# Patient Record
Sex: FEMALE | Race: BLACK OR AFRICAN AMERICAN | NOT HISPANIC OR LATINO | Employment: FULL TIME | URBAN - METROPOLITAN AREA
[De-identification: names, ages, dates, MRNs, and addresses within clinical notes are randomized per-mention and may not be internally consistent; named-entity substitution may affect disease eponyms.]

---

## 2017-01-03 ENCOUNTER — ALLSCRIPTS OFFICE VISIT (OUTPATIENT)
Dept: OTHER | Facility: OTHER | Age: 37
End: 2017-01-03

## 2017-01-05 ENCOUNTER — GENERIC CONVERSION - ENCOUNTER (OUTPATIENT)
Dept: OTHER | Facility: OTHER | Age: 37
End: 2017-01-05

## 2017-02-03 ENCOUNTER — APPOINTMENT (OUTPATIENT)
Dept: URGENT CARE | Age: 37
End: 2017-02-03
Payer: OTHER MISCELLANEOUS

## 2017-02-03 PROCEDURE — 99214 OFFICE O/P EST MOD 30 MIN: CPT | Performed by: PREVENTIVE MEDICINE

## 2017-02-15 ENCOUNTER — APPOINTMENT (OUTPATIENT)
Dept: URGENT CARE | Age: 37
End: 2017-02-15
Payer: OTHER MISCELLANEOUS

## 2017-02-15 PROCEDURE — 99213 OFFICE O/P EST LOW 20 MIN: CPT | Performed by: PREVENTIVE MEDICINE

## 2018-01-10 NOTE — RESULT NOTES
Verified Results  (1) COMPREHENSIVE METABOLIC PANEL [id number] 90:70PX Jamar Fernandez Marker Order Number: [id number]     Test Name Result Flag Reference   GLUCOSE,RANDM 79 mg/dL     If the patient is fasting, the ADA then defines impaired fasting glucose as > 100 mg/dL and diabetes as > or equal to 123 mg/dL  SODIUM 135 mmol/L L 136-145   POTASSIUM 4 4 mmol/L  3 5-5 3   CHLORIDE 107 mmol/L  100-108   CARBON DIOXIDE 22 mmol/L  21-32   ANION GAP (CALC) 6 mmol/L  4-13   BLOOD UREA NITROGEN 10 mg/dL  5-25   CREATININE 0 69 mg/dL  0 60-1 30   Standardized to IDMS reference method   CALCIUM 8 6 mg/dL  8 3-10 1   BILI, TOTAL 0 39 mg/dL  0 20-1 00   ALK PHOSPHATAS 39 U/L L    ALT (SGPT) 18 U/L  12-78   AST(SGOT) 11 U/L  5-45   ALBUMIN 3 4 g/dL L 3 5-5 0   TOTAL PROTEIN 6 7 g/dL  6 4-8 2   eGFR Non-African American      >60 0 ml/min/1 [facility] Disease Education Program recommendations are as follows:  GFR calculation is accurate only with a steady state creatinine  Chronic Kidney disease less than 60 ml/min/1 73 sq  meters  Kidney failure less than 15 ml/min/1 73 sq  meters  (1) LIPID PANEL, FASTING 14Kta2629 10:50AM Jamar Fernandez Marker Order Number: [id number]     Test Name Result Flag Reference   CHOLESTEROL 121 mg/dL     HDL,DIRECT 43 mg/dL  40-60   Specimen collection should occur prior to Metamizole administration due to the potential for falsely depressed results  LDL CHOLESTEROL CALCULATED 64 mg/dL  0-100   Triglyceride:         Normal              <150 mg/dl       Borderline High    150-199 mg/dl       High               200-499 mg/dl       Very High          >499 mg/dl  Cholesterol:         Desirable        <200 mg/dl      Borderline High  200-239 mg/dl      High             >239 mg/dl  HDL Cholesterol:        High    >59 mg/dL      Low     <41 mg/dL  LDL CALCULATED:    This screening LDL is a calculated result    It does not have the accuracy of the Direct Measured LDL in the monitoring of patients with hyperlipidemia and/or statin therapy  Direct Measure LDL (GKK471) must be ordered separately in these patients  TRIGLYCERIDES 69 mg/dL  <=150   Specimen collection should occur prior to N-Acetylcysteine or Metamizole administration due to the potential for falsely depressed results  (1) TSH WITH FT4 REFLEX 93Zxf9231 10:50AM Otoniel Fernandez Order Number: [id number]     Test Name Result Flag Reference   TSH 0 564 uIU/mL  0 358-3 740   Patients undergoing fluorescein dye angiography may retain small amounts of fluorescein in the body for 48-72 hours post procedure  Samples containing fluorescein can produce falsely depressed TSH values  If the patient had this procedure,a specimen should be resubmitted post fluorescein clearance            The recommended reference ranges for TSH during pregnancy are as follows:  First trimester 0 1 to 2 5 uIU/mL  Second trimester  0 2 to 3 0 uIU/mL  Third trimester 0 3 to 3 0 uIU/m       Discussion/Summary     cholesterol looks good   normal liver, kidney function and blood glucose   overall looks good!   - Dr Chet Wright   Electronically signed by : Rolo Hoffman MD; Aug 22 2016  7:48AM EST                       (Author)

## 2018-01-11 NOTE — PROGRESS NOTES
Assessment    1  Encounter for preventive health examination (V70 0) (Z00 00)   2  Lipid screening (V77 91) (Z13 220)   3  Encounter for screening for cardiovascular disorders (V81 2) (Z13 6)    Plan  Encounter for screening for cardiovascular disorders, Lipid screening    · (1) COMPREHENSIVE METABOLIC PANEL; Status:Active; Requested for:11May2016;    · (1) LIPID PANEL, FASTING; Status:Active; Requested for:11May2016;    · (1) TSH WITH FT4 REFLEX; Status:Active; Requested for:11May2016; Health Maintenance    · Follow-up visit in 1 year Evaluation and Treatment  Follow-up  Status: Hold For -  Scheduling  Requested for: 86VUB2038   · Begin a limited exercise program ; Status:Complete;   Done: 17YKK3820 09:37AM   · Begin or continue regular aerobic exercise  Gradually work up to at least 3 sessions of 30  minutes of exercise a week ; Status:Complete;   Done: 84ZTD4482 09:37AM   · Brush your teeth 3 times a day and floss at least once a day ; Status:Complete;   Done:  60QEF1491 09:37AM   · Eat a low fat and low cholesterol diet ; Status:Complete;   Done: 79TMB7853 09:37AM   · Use a sun block product with an SPF of 15 or more ; Status:Complete;   Done:  89GUS6934 09:37AM   · Vitamins can help you get daily requirements that your diet may not be giving you ;  Status:Complete;   Done: 07WTB7489 09:37AM   · We recommend routine visits to a dentist ; Status:Complete;   Done: 50GUI3420 09:37AM   · Call [phone] if: You find a new or different kind of lump in your breast ;  Status:Complete;   Done: 95DHH5834 09:37AM   · Call [phone] if: You have any warning signs of skin cancer ; Status:Complete;    Done: 96LHZ2570 09:37AM    Discussion/Summary  health maintenance visit Currently, she eats a healthy diet   the risks and benefits of cervical cancer screening were discussed WILL BRING BACK PATIENT FOR PAP SMEAR Breast cancer screening: the risks and benefits of breast cancer screening were discussed and breast cancer screening is not indicated  Colorectal cancer screening: the risks and benefits of colorectal cancer screening were discussed and colorectal cancer screening is not indicated  Osteoporosis screening: the risks and benefits of osteoporosis screening were discussed and bone mineral density testing is not indicated  Screening lab work includes glucose, lipid profile and thyroid function testing  The immunizations are up to date  She was advised to be evaluated by an ophthalmologist and a dentist  Advice and education were given regarding nutrition, aerobic exercise, calcium supplements, vitamin D supplements, reproductive health, contraception, alcohol use, sunscreen use, self skin examination, helmet use and seat belt use  Patient discussion: discussed with the patient  Chief Complaint  Np here for wellness exam      History of Present Illness  HM, Adult Female: The patient is being seen for a health maintenance evaluation  The last health maintenance visit was 1 year(s) ago  General Health: The patient's health since the last visit is described as good  She does not have regular dental visits  The patient hasn't had a dental visit  She denies vision problems  She denies hearing loss  Immunizations status: up to date  Lifestyle:  She consumes a diverse and healthy diet  She does not have any weight concerns  She does not exercise regularly  She does not use tobacco  She denies alcohol use  She denies drug use  Reproductive health:  she reports normal menses  Menstrual history: LMP: the last menstrual period was last month   Recent menstrual periods: bleeding has been normal  The cycles have been regular  The duration of her recent periods has been regular  she uses no contraception  she is not sexually active  pregnancy history: G 2P 2, 2  Screening: cancer screening reviewed and updated  metabolic screening reviewed and updated  risk screening reviewed and updated     HPI: [address] ABOUT 1 YEAR AGO  TDAP GIVEN BACK IN HER COUNTRY 2 YEARS AGO       Review of Systems    Constitutional: No fever, no chills, feels well, no tiredness, no recent weight gain or weight loss  Eyes: No complaints of eye pain, no red eyes, no eyesight problems, no discharge, no dry eyes, no itching of eyes  ENT: no complaints of earache, no loss of hearing, no nose bleeds, no nasal discharge, no sore throat, no hoarseness  Cardiovascular: No complaints of slow heart rate, no fast heart rate, no chest pain, no palpitations, no leg claudication, no lower extremity edema  Respiratory: No complaints of shortness of breath, no wheezing, no cough, no SOB on exertion, no orthopnea, no PND  Gastrointestinal: No complaints of abdominal pain, no constipation, no nausea or vomiting, no diarrhea, no bloody stools  Genitourinary: No complaints of dysuria, no incontinence, no pelvic pain, no dysmenorrhea, no vaginal discharge or bleeding  Musculoskeletal: No complaints of arthralgias, no myalgias, no joint swelling or stiffness, no limb pain or swelling  Integumentary: No complaints of skin rash or lesions, no itching, no skin wounds, no breast pain or lump  Neurological: No complaints of headache, no confusion, no convulsions, no numbness, no dizziness or fainting, no tingling, no limb weakness, no difficulty walking  Psychiatric: Not suicidal, no sleep disturbance, no anxiety or depression, no change in personality, no emotional problems  Endocrine: No complaints of proptosis, no hot flashes, no muscle weakness, no deepening of the voice, no feelings of weakness  Hematologic/Lymphatic: No complaints of swollen glands, no swollen glands in the neck, does not bleed easily, does not bruise easily        Past Medical History    · History of No significant past medical history    Surgical History    · History of  Section    Family History  Mother    · Family history of No known health problems  Father    · Family history of No known health problems    Social History    · Denied: History of Alcohol use   · Denied: History of Drug use   · Never a smoker   · No alcohol use   · No drug use    Current Meds   1  No Reported Medications Recorded    Allergies    1  No Known Drug Allergies    Vitals   Recorded: 22KYK3359 09:14AM   Heart Rate 63   Systolic 766   Diastolic 62   Height 5 ft 3 74 in   Weight 133 lb    BMI Calculated 23 02   BSA Calculated 1 64     Physical Exam    Constitutional   General appearance: No acute distress, well appearing and well nourished  Head and Face   Head and face: Normal     Palpation of the face and sinuses: No sinus tenderness  Eyes   Conjunctiva and lids: No swelling, erythema or discharge  Pupils and irises: Equal, round, reactive to light  Ophthalmoscopic examination: Normal fundi and optic discs  Ears, Nose, Mouth, and Throat   External inspection of ears and nose: Normal     Otoscopic examination: Tympanic membranes translucent with normal light reflex  Canals patent without erythema  Hearing: Normal     Nasal mucosa, septum, and turbinates: Normal without edema or erythema  Lips, teeth, and gums: Normal, good dentition  Oropharynx: Normal with no erythema, edema, exudate or lesions  Neck   Neck: Supple, symmetric, trachea midline, no masses  Thyroid: Normal, no thyromegaly  Pulmonary   Respiratory effort: No increased work of breathing or signs of respiratory distress  Percussion of chest: Normal     Palpation of chest: Normal     Auscultation of lungs: Clear to auscultation  Cardiovascular   Palpation of heart: Normal PMI, no thrills  Auscultation of heart: Normal rate and rhythm, normal S1 and S2, no murmurs  Examination of extremities for edema and/or varicosities: Normal     Chest   Chest: Normal     Abdomen   Abdomen: Non-tender, no masses  Liver and spleen: No hepatomegaly or splenomegaly      Examination for hernias: No hernia appreciated  Lymphatic   Palpation of lymph nodes in neck: No lymphadenopathy  Palpation of lymph nodes in axillae: No lymphadenopathy  Palpation of lymph nodes in groin: No lymphadenopathy  Palpation of lymph nodes in other areas: No lymphadenopathy  Musculoskeletal   Gait and station: Normal     Digits and nails: Normal without clubbing or cyanosis  Joints, bones, and muscles: Normal     Range of motion: Normal     Stability: Normal     Muscle strength/tone: Normal     Skin   Skin and subcutaneous tissue: Normal without rashes or lesions  Palpation of skin and subcutaneous tissue: Normal turgor  Neurologic   Cranial nerves: Cranial nerves II-XII intact  Cortical function: Normal mental status  Reflexes: 2+ and symmetric  Sensation: No sensory loss  Coordination: Normal finger to nose and heel to shin  Psychiatric   Judgment and insight: Normal     Orientation to person, place, and time: Normal     Recent and remote memory: Intact  Mood and affect: Normal        Results/Data  PHQ-2 Adult Depression Screening 80MUK5474 09:16AM User, s     Test Name Result Flag Reference   PHQ-2 Adult Depression Score 0     Over the last two weeks, how often have you been bothered by any of the following problems? Little interest or pleasure in doing things: Not at all - 0  Feeling down, depressed, or hopeless: Not at all - 0   PHQ-2 Adult Depression Screening Negative         Future Appointments    Date/Time Provider Specialty Site   05/12/2016 01:00 PM BELINDA Nelson   Orthopedic Surgery [facility]     Signatures   Electronically signed by : Noam Sims MD; May 11 2016  9:38AM EST                       (Author)

## 2018-01-11 NOTE — MISCELLANEOUS
Message   Recorded as Task   Date: 12/13/2016 04:03 PM, Created By: Maxine Abarca   Task Name: Follow Up   Assigned To: [facility]   Regarding Patient: Maria Antonia Pagan, Status: Active   Comment:    Gaye Hartman - 13 Dec 2016 4:03 PM     TASK CREATED  Caller: Gordo Garcia; General Medical Question; [phone]  Received a TC from Gordo Garcia nurse   The pt's employer is inquirying as to when the pt  will be able to return to full duty c/ no restrictions  She is requesting a procedure note from the RFA she had done this past week along with a note for the employer with work restrictions be faxed to [phone]  AS to advise  thanks   [address] - 13 Dec 2016 4:16 PM     TASK REPLIED TO: Previously Assigned To [facility],Team  Work restriction note placed in [employer]  Ros Maldonado - 14 Dec 2016 9:05 AM     TASK EDITED  Work restriction letter & procedure note were faxed to number requested  L/M on  # making aware  Kyrie Turner - 14 Dec 2016 11:30 AM     TASK REPLIED TO: Previously Assigned To [place]  Thanks  Active Problems    1  Acute maxillary sinusitis (461 0) (J01 00)   2  Cervical myofascial pain syndrome (729 1) (M79 1)   3  Cervical pain (neck) (723 1) (M54 2)   4  Cervical spondylosis (721 0) (M47 812)   5  Chronic pain syndrome (338 4) (G89 4)   6  Cough (786 2) (R05)   7  Encounter for screening for cardiovascular disorders (V81 2) (Z13 6)   8  Lipid screening (V77 91) (Z13 220)    Allergies    1   No Known Drug Allergies    Signatures   Electronically signed by : Didier Santiago RN; Dec 14 2016 11:31AM EST                       (Author)

## 2018-01-12 NOTE — MISCELLANEOUS
Message   Recorded as Task   Date: 12/15/2016 11:07 AM, Created By: Gabby Holt   Task Name: Miscellaneous   Assigned To: Gabby Holt   Regarding Patient: Zohra Gomez, Status: Active   CommentPanakul Jara - 15 Dec 9641 51:10 AM     TASK CREATED  Pt called and said that she is in an increased amount of pain since RFA  I explained it could take up to 4 weeks and she is requesting a note for work to take her out for a couple of days becuase she said standing makes it worse  Please advise  Thanks   Kyrie Turner - 15 Dec 2016 3:47 PM     TASK REPLIED TO: Previously Assigned To [address]  I am going to give her a course of steroids for one weeks duration to help ease the pain from the RFA  Please have her take it and continue work  Medrol dosepak sent to pharmacy  Olimpia Orellana - 16 Dec 3214 9:20 AM     TASK EDITED  Patient aware  Active Problems    1  Acute maxillary sinusitis (461 0) (J01 00)   2  Cervical myofascial pain syndrome (729 1) (M79 1)   3  Cervical pain (neck) (723 1) (M54 2)   4  Cervical spondylosis (721 0) (M47 812)   5  Chronic pain syndrome (338 4) (G89 4)   6  Cough (786 2) (R05)   7  Encounter for screening for cardiovascular disorders (V81 2) (Z13 6)   8  Lipid screening (V77 91) (Z13 220)    Current Meds   1  MethylPREDNISolone 4 MG Oral Tablet Therapy Pack; take as directed; Therapy: 74RRO9012 to (Last Rx:38Kzg1111)  Requested for: 37Ybt2351 Ordered    Allergies    1   No Known Drug Allergies    Signatures   Electronically signed by : Gaviota ePrson, ; Dec 16 2016  8:16AM EST                       (Author)

## 2018-01-12 NOTE — MISCELLANEOUS
Message   Recorded as Task   Date: 12/13/2016 04:03 PM, Created By: [facility]   Task Name: Follow Up   Assigned To: [facility]   Regarding Patient: Galina Silva, Status: Active   Comment:    DevanGaye horner - 13 Dec 2016 4:03 PM     TASK CREATED  Caller: Nna Hoffmann; General Medical Question; [phone]  Received a TC from Nan Hoffmann nurse   The pt's employer is inquirying as to when the pt  will be able to return to full duty c/ no restrictions  She is requesting a procedure note from the RFA she had done this past week along with a note for the employer with work restrictions be faxed to [phone]  AS to advistamera Edmond - 13 Dec 2016 4:16 PM     TASK REPLIED TO: Previously Assigned To [facility],Team  Work restriction note placed in [employer]  Ros Maldonado - 14 Dec 2016 9:05 AM     TASK EDITED  Work restriction letter & procedure note were faxed to number requested  L/M on  # making aware  Kyrie Turner - 14 Dec 2016 11:30 AM     TASK REPLIED TO: Previously Assigned To [place]  Thanks  Stavropoulos,Ros - 14 Dec 2016 11:31 AM     TASK COMPLETED   Ros Maldonado - 14 Dec 2016 1:10 PM     TASK REACTIVATED   Ros Maldonado - 14 Dec 2016 1:25 PM     TASK EDITED  [employer] called, she is requesting clarification on the return to work note  Currently the pt is worling but restricted to driving clients  She is requesting that a line is added to the note stating continue current restrictions until 1/6/16  After her f/u w/Dr Turner 1/3/17 pt will return to [facility] and they then will decided her final restrictions  Luna Edmond - 14 Dec 2016 2:38 PM     TASK REPLIED TO: Previously Assigned To [place] Ed[place]  Work note updated in [employer]scripts  Ros Maldonado - 14 Dec 2016 2:43 PM     TASK EDITED  Faxed as requested  Active Problems    1   Acute maxillary sinusitis (461 0) (J01 00)   2  Cervical myofascial pain syndrome (729 1) (M79 1)   3  Cervical pain (neck) (723 1) (M54 2)   4  Cervical spondylosis (721 0) (M47 812)   5  Chronic pain syndrome (338 4) (G89 4)   6  Cough (786 2) (R05)   7  Encounter for screening for cardiovascular disorders (V81 2) (Z13 6)   8  Lipid screening (V77 91) (Z13 220)    Allergies    1   No Known Drug Allergies    Signatures   Electronically signed by : Gabby Tierney RN; Dec 14 2016  2:59PM EST                       (Author)

## 2018-01-13 NOTE — MISCELLANEOUS
Message   Recorded as Task   Date: 11/15/2016 08:56 AM, Created By: Mally Ramirez   Task Name: Care Coordination   Assigned To: [address] ,Team   Regarding Patient: Octaviano Pfeiffer, Status: Active   Comment:    Mally Coffeynimo - 15 Nov 2016 8:56 AM     TASK CREATED  Received pain diary after MBB #1  It looks like the patient is scheduled for RFA  Did the insurance approve the RFA after one MBB or do we have to do a second MBB before RFA? Gaye Hartman - 15 Nov 2016 10:12 AM     TASK EDITED   Faina Letters - 16 Nov 2016 3:30 PM     TASK EDITED    Pt  mac requesting notes sent to her  **Spoke to Mario Rai from the pt's [place] stating that she needs the procedure note from 11/11/16 faxed to [phone] then she can submit for approval to do RFA without having to do MBB#2  Amie's office # [phone]   [facility] - 17 Nov 2016 10:18 AM     TASK REASSIGNED: Previously Assigned To [facility],Team   Procedure Note dated 11/11/16 was faxed over to Mario Rai ~ [employer], Fax # [phone]  Active Problems    1  Acute maxillary sinusitis (461 0) (J01 00)   2  Cervical myofascial pain syndrome (729 1) (M79 1)   3  Cervical pain (neck) (723 1) (M54 2)   4  Cervical spondylosis (721 0) (M47 812)   5  Chronic pain syndrome (338 4) (G89 4)   6  Cough (786 2) (R05)   7  Encounter for screening for cardiovascular disorders (V81 2) (Z13 6)   8  Lipid screening (V77 91) (Z13 220)    Allergies    1   No Known Drug Allergies    Signatures   Electronically signed by : Lamont Liz, ; Nov 17 2016 10:24AM EST                       (Author)

## 2018-01-13 NOTE — PROGRESS NOTES
Assessment    1  Acute maxillary sinusitis (461 0) (J01 00)   2  Cough (786 2) (R05)    Plan  Acute maxillary sinusitis, Cough    · Azithromycin 250 MG Oral Tablet; TAKE 2 TABLETS ON DAY 1 THEN TAKE 1  TABLET A DAY FOR 4 DAYS    Discussion/Summary  went over blood work with her today   Possible side effects of new medications were reviewed with the patient/guardian today  The treatment plan was reviewed with the patient/guardian  The patient/guardian understands and agrees with the treatment plan      Chief Complaint  Patient here for a follow-up to go over blood work   Patient is here today for follow up of chronic conditions described in HPI  History of Present Illness  didn't get her Augmentin filled due to $75 co-pay cost  still coughing and sinus issues but better than last week   taking OTC cough medications where are helping   The patient is being seen for an initial evaluation of sinusitis  The sinusitis involves the maxillary sinuses  The patient is currently experiencing symptoms  facial pain facial pressure headache no retro-orbital pain no dental pain nasal congestion clear rhinorrhea no purulent rhinorrhea no postnasal drainage   Associated symptoms:  cough, but no fever, no chills, no nausea, no ear fullness, no ear pain, no ear pressure, no diplopia, no periorbital redness, no periorbital swelling, no neck stiffness and no sore throat  Current treatment includes nasal washes and non-prescription analgesics  By report, there is fair symptom control  Review of Systems    Constitutional: No fever, no chills, feels well, no tiredness, no recent weight gain or weight loss  Eyes: No complaints of eye pain, no red eyes, no eyesight problems, no discharge, no dry eyes, no itching of eyes  ENT: as noted in HPI  Cardiovascular: No complaints of slow heart rate, no fast heart rate, no chest pain, no palpitations, no leg claudication, no lower extremity edema     Respiratory: cough, but no shortness of breath, no wheezing and no shortness of breath during exertion  Gastrointestinal: No complaints of abdominal pain, no constipation, no nausea or vomiting, no diarrhea, no bloody stools  Active Problems    1  Acute maxillary sinusitis (461 0) (J01 00)   2  Cervical pain (neck) (723 1) (M54 2)   3  Cough (786 2) (R05)   4  Encounter for screening for cardiovascular disorders (V81 2) (Z13 6)   5  Lipid screening (V77 91) (V56 964)    Past Medical History    1  History of No significant past medical history    The active problems and past medical history were reviewed and updated today  Surgical History    1  History of  Section    The surgical history was reviewed and updated today  Family History  Mother    1  Family history of No known health problems  Father    2  Family history of No known health problems    The family history was reviewed and updated today  Social History    · Denied: History of Alcohol use   · Denied: History of Drug use   · Never a smoker   · No alcohol use   · No drug use  The social history was reviewed and updated today  The social history was reviewed and is unchanged  Current Meds   1  Amoxicillin-Pot Clavulanate 875-125 MG Oral Tablet; TAKE 1 TABLET EVERY 12 HOURS   WITH MEALS UNTIL GONE;   Therapy: 79EHP3192 to (Complete:59Nsx8315)  Requested for: 36Pax8121; Last   Rx:73Vur2289 Ordered   2  Naproxen TABS; Therapy: (Recorded:33Bab1487) to Recorded    The medication list was reviewed and updated today  Allergies    1  No Known Drug Allergies    Vitals  Vital Signs    Recorded: 12BYT5991 67:80OE   Systolic 868   Diastolic 60   Heart Rate 68   Respiration 18   Height 5 ft 3 7 in   Weight 133 lb    BMI Calculated 23 04   BSA Calculated 1 64     Physical Exam    Constitutional   General appearance: No acute distress, well appearing and well nourished  Eyes   Conjunctiva and lids: No swelling, erythema or discharge      Pupils and irises: Equal, round and reactive to light  Ears, Nose, Mouth, and Throat   External inspection of ears and nose: Normal     Otoscopic examination: Tympanic membranes translucent with normal light reflex  Canals patent without erythema  Nasal mucosa, septum, and turbinates: Normal without edema or erythema  Oropharynx: Abnormal   post nasal drip  Pulmonary   Respiratory effort: No increased work of breathing or signs of respiratory distress  Auscultation of lungs: Clear to auscultation  Cardiovascular   Palpation of heart: Normal PMI, no thrills  Auscultation of heart: Normal rate and rhythm, normal S1 and S2, without murmurs  Lymphatic   Palpation of lymph nodes in neck: No lymphadenopathy  Results/Data  (1) COMPREHENSIVE METABOLIC PANEL [id number] 77:50BY Lennox Fernandez Order Number: [id number]     Test Name Result Flag Reference   GLUCOSE,RANDM 79 mg/dL     If the patient is fasting, the ADA then defines impaired fasting glucose as > 100 mg/dL and diabetes as > or equal to 123 mg/dL  SODIUM 135 mmol/L L 136-145   POTASSIUM 4 4 mmol/L  3 5-5 3   CHLORIDE 107 mmol/L  100-108   CARBON DIOXIDE 22 mmol/L  21-32   ANION GAP (CALC) 6 mmol/L  4-13   BLOOD UREA NITROGEN 10 mg/dL  5-25   CREATININE 0 69 mg/dL  0 60-1 30   Standardized to IDMS reference method   CALCIUM 8 6 mg/dL  8 3-10 1   BILI, TOTAL 0 39 mg/dL  0 20-1 00   ALK PHOSPHATAS 39 U/L L    ALT (SGPT) 18 U/L  12-78   AST(SGOT) 11 U/L  5-45   ALBUMIN 3 4 g/dL L 3 5-5 0   TOTAL PROTEIN 6 7 g/dL  6 4-8 2   eGFR Non-African American      >60 0 ml/min/1 [facility] Disease Education Program recommendations are as follows:  GFR calculation is accurate only with a steady state creatinine  Chronic Kidney disease less than 60 ml/min/1 73 sq  meters  Kidney failure less than 15 ml/min/1 73 sq  meters       (1) LIPID PANEL, FASTING [id number] 10:50AM Lennox Fernandez Order Number: [id number] Test Name Result Flag Reference   CHOLESTEROL 121 mg/dL     HDL,DIRECT 43 mg/dL  40-60   Specimen collection should occur prior to Metamizole administration due to the potential for falsely depressed results  LDL CHOLESTEROL CALCULATED 64 mg/dL  0-100   Triglyceride:         Normal              <150 mg/dl       Borderline High    150-199 mg/dl       High               200-499 mg/dl       Very High          >499 mg/dl  Cholesterol:         Desirable        <200 mg/dl      Borderline High  200-239 mg/dl      High             >239 mg/dl  HDL Cholesterol:        High    >59 mg/dL      Low     <41 mg/dL  LDL CALCULATED:    This screening LDL is a calculated result  It does not have the accuracy of the Direct Measured LDL in the monitoring of patients with hyperlipidemia and/or statin therapy  Direct Measure LDL (KES286) must be ordered separately in these patients  TRIGLYCERIDES 69 mg/dL  <=150   Specimen collection should occur prior to N-Acetylcysteine or Metamizole administration due to the potential for falsely depressed results  (1) TSH WITH FT4 REFLEX 43Jxf6834 10:50AM Estuardo Fernandez Order Number: [id number]     Test Name Result Flag Reference   TSH 0 564 uIU/mL  0 358-3 740   Patients undergoing fluorescein dye angiography may retain small amounts of fluorescein in the body for 48-72 hours post procedure  Samples containing fluorescein can produce falsely depressed TSH values  If the patient had this procedure,a specimen should be resubmitted post fluorescein clearance            The recommended reference ranges for TSH during pregnancy are as follows:  First trimester 0 1 to 2 5 uIU/mL  Second trimester  0 2 to 3 0 uIU/mL  Third trimester 0 3 to 3 0 uIU/m     Signatures   Electronically signed by : Kandis Kennedy MD; Aug 22 2016  2:02PM EST                       (Author)

## 2018-01-13 NOTE — MISCELLANEOUS
Message   Recorded as Task   Date: 12/12/2016 12:02 PM, Created By: Susan Martinez   Task Name: Follow Up   Assigned To: [facility],Team   Regarding Patient: Didi Hester, Status: Active   Comment:    Ros Maldonado - 12 Dec 2016 12:02 PM     TASK CREATED  Pt is post L C3 C4 C5 C6 RFA done on 12/9/16 w/Dr Turner  Pt states she still has pain but it is improving  Informed pt it can take up to 5-6 weeks for the nerves to degenerate  Pt reports that she has no problems with the injection sites  Confirmed sam w/Leah on 1/3/17  Ros Maldonado - 12 Dec 2016 12:02 PM     TASK EDITED   [address] [employer] [address] - 12 Dec 2016 12:43 PM     TASK REPLIED TO: Previously Assigned To [place]  Thanks  Active Problems    1  Acute maxillary sinusitis (461 0) (J01 00)   2  Cervical myofascial pain syndrome (729 1) (M79 1)   3  Cervical pain (neck) (723 1) (M54 2)   4  Cervical spondylosis (721 0) (M47 812)   5  Chronic pain syndrome (338 4) (G89 4)   6  Cough (786 2) (R05)   7  Encounter for screening for cardiovascular disorders (V81 2) (Z13 6)   8  Lipid screening (V77 91) (Z13 220)    Allergies    1   No Known Drug Allergies    Signatures   Electronically signed by : Alber Jiménez RN; Dec 12 2016 12:51PM EST                       (Author)

## 2018-01-14 VITALS
HEIGHT: 64 IN | BODY MASS INDEX: 23.56 KG/M2 | DIASTOLIC BLOOD PRESSURE: 62 MMHG | HEART RATE: 76 BPM | SYSTOLIC BLOOD PRESSURE: 104 MMHG | WEIGHT: 138 LBS
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
## 2018-01-15 NOTE — MISCELLANEOUS
Message  Return to work or school:        Continue current work restrictions until patient seen in follow up on 1/3/17     Kaila Nelson MD       Signatures   Electronically signed by : BELINDA Mohr ; Dec 13 2016  4:15PM EST                       (Author)    Electronically signed by : BELINDA Mohr ; Dec 14 2016  2:38PM EST                       (Author)

## 2018-01-15 NOTE — MISCELLANEOUS
Message  Spoke with Liliana Wasserman ~ Nurse  for patient, she requested copy of last office note, DOS: 01/03/2017 faxed to her attn, Fax # [phone]  Note was faxed! Active Problems    1  Acute maxillary sinusitis (461 0) (J01 00)   2  Cervical myofascial pain syndrome (729 1) (M79 1)   3  Cervical pain (neck) (723 1) (M54 2)   4  Cervical spondylosis (721 0) (M47 812)   5  Chronic pain syndrome (338 4) (G89 4)   6  Cough (786 2) (R05)   7  Encounter for screening for cardiovascular disorders (V81 2) (Z13 6)   8  Lipid screening (V77 91) (Z13 220)    Current Meds   1  MethylPREDNISolone 4 MG Oral Tablet Therapy Pack; take as directed; Therapy: 10ACJ7747 to (Last Rx:84Whe9517)  Requested for: 72Yin6329 Ordered    Allergies    1   No Known Drug Allergies    Signatures   Electronically signed by : Lamont Pantoja, ; Jan 5 2017 10:35AM EST                       (Author)

## 2018-01-26 ENCOUNTER — APPOINTMENT (OUTPATIENT)
Dept: URGENT CARE | Age: 38
End: 2018-01-26
Payer: OTHER MISCELLANEOUS

## 2018-01-26 PROCEDURE — 99213 OFFICE O/P EST LOW 20 MIN: CPT | Performed by: PREVENTIVE MEDICINE

## 2018-02-15 ENCOUNTER — CLINICAL SUPPORT (OUTPATIENT)
Dept: PAIN MEDICINE | Facility: CLINIC | Age: 38
End: 2018-02-15
Payer: OTHER MISCELLANEOUS

## 2018-02-15 VITALS
DIASTOLIC BLOOD PRESSURE: 61 MMHG | HEIGHT: 62 IN | HEART RATE: 67 BPM | WEIGHT: 138 LBS | TEMPERATURE: 97.6 F | BODY MASS INDEX: 25.4 KG/M2 | SYSTOLIC BLOOD PRESSURE: 98 MMHG

## 2018-02-15 DIAGNOSIS — M54.2 CERVICAL PAIN (NECK): ICD-10-CM

## 2018-02-15 DIAGNOSIS — M47.812 CERVICAL FACET SYNDROME: Primary | ICD-10-CM

## 2018-02-15 DIAGNOSIS — G89.4 CHRONIC PAIN DISORDER: ICD-10-CM

## 2018-02-15 DIAGNOSIS — M79.18 CERVICAL MYOFASCIAL PAIN SYNDROME: ICD-10-CM

## 2018-02-15 PROCEDURE — 99214 OFFICE O/P EST MOD 30 MIN: CPT | Performed by: ANESTHESIOLOGY

## 2018-02-15 RX ORDER — IBUPROFEN 400 MG/1
TABLET ORAL EVERY 6 HOURS PRN
COMMUNITY
End: 2018-02-22
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
## 2018-02-22 ENCOUNTER — HOSPITAL ENCOUNTER (EMERGENCY)
Facility: HOSPITAL | Age: 38
Discharge: HOME | End: 2018-02-22
Attending: EMERGENCY MEDICINE

## 2018-02-22 VITALS
DIASTOLIC BLOOD PRESSURE: 60 MMHG | SYSTOLIC BLOOD PRESSURE: 102 MMHG | HEIGHT: 62 IN | WEIGHT: 136 LBS | BODY MASS INDEX: 25.03 KG/M2 | OXYGEN SATURATION: 100 % | RESPIRATION RATE: 18 BRPM | TEMPERATURE: 97.6 F | HEART RATE: 71 BPM

## 2018-02-22 DIAGNOSIS — K52.9 ENTERITIS: Primary | ICD-10-CM

## 2018-02-22 DIAGNOSIS — R10.84 GENERALIZED ABDOMINAL PAIN: ICD-10-CM

## 2018-02-22 LAB
ALBUMIN SERPL BCP-MCNC: 3.5 G/DL (ref 3.5–5)
ALP SERPL-CCNC: 59 U/L (ref 46–116)
ALT SERPL W P-5'-P-CCNC: 21 U/L (ref 12–78)
ANION GAP SERPL CALCULATED.3IONS-SCNC: 6 MMOL/L (ref 4–13)
AST SERPL W P-5'-P-CCNC: 15 U/L (ref 5–45)
BACTERIA UR QL AUTO: NORMAL /HPF
BASOPHILS # BLD AUTO: 0.02 THOUSANDS/ΜL (ref 0–0.1)
BASOPHILS NFR BLD AUTO: 0 % (ref 0–1)
BILIRUB SERPL-MCNC: 0.26 MG/DL (ref 0.2–1)
BILIRUB UR QL STRIP: NEGATIVE
BUN SERPL-MCNC: 10 MG/DL (ref 5–25)
CALCIUM SERPL-MCNC: 7.8 MG/DL (ref 8.3–10.1)
CHLORIDE SERPL-SCNC: 111 MMOL/L (ref 100–108)
CLARITY UR: CLEAR
CO2 SERPL-SCNC: 23 MMOL/L (ref 21–32)
COLOR UR: YELLOW
CREAT SERPL-MCNC: 0.6 MG/DL (ref 0.6–1.3)
EOSINOPHIL # BLD AUTO: 0.06 THOUSAND/ΜL (ref 0–0.61)
EOSINOPHIL NFR BLD AUTO: 1 % (ref 0–6)
ERYTHROCYTE [DISTWIDTH] IN BLOOD BY AUTOMATED COUNT: 17.1 % (ref 11.6–15.1)
EXT PREG TEST URINE: NEGATIVE
GFR SERPL CREATININE-BSD FRML MDRD: 137 ML/MIN/1.73SQ M
GLUCOSE SERPL-MCNC: 83 MG/DL (ref 65–140)
GLUCOSE UR STRIP-MCNC: NEGATIVE MG/DL
HCT VFR BLD AUTO: 33.9 % (ref 34.8–46.1)
HGB BLD-MCNC: 10.4 G/DL (ref 11.5–15.4)
HGB UR QL STRIP.AUTO: ABNORMAL
HYALINE CASTS #/AREA URNS LPF: NORMAL /LPF
KETONES UR STRIP-MCNC: NEGATIVE MG/DL
LEUKOCYTE ESTERASE UR QL STRIP: NEGATIVE
LIPASE SERPL-CCNC: 116 U/L (ref 73–393)
LYMPHOCYTES # BLD AUTO: 2.3 THOUSANDS/ΜL (ref 0.6–4.47)
LYMPHOCYTES NFR BLD AUTO: 37 % (ref 14–44)
MCH RBC QN AUTO: 22.9 PG (ref 26.8–34.3)
MCHC RBC AUTO-ENTMCNC: 30.7 G/DL (ref 31.4–37.4)
MCV RBC AUTO: 75 FL (ref 82–98)
MONOCYTES # BLD AUTO: 0.48 THOUSAND/ΜL (ref 0.17–1.22)
MONOCYTES NFR BLD AUTO: 8 % (ref 4–12)
NEUTROPHILS # BLD AUTO: 3.26 THOUSANDS/ΜL (ref 1.85–7.62)
NEUTS SEG NFR BLD AUTO: 54 % (ref 43–75)
NITRITE UR QL STRIP: NEGATIVE
NON-SQ EPI CELLS URNS QL MICRO: NORMAL /HPF
NRBC BLD AUTO-RTO: 0 /100 WBCS
PH UR STRIP.AUTO: 5.5 [PH] (ref 4.5–8)
PLATELET # BLD AUTO: 368 THOUSANDS/UL (ref 149–390)
PMV BLD AUTO: 10.8 FL (ref 8.9–12.7)
POTASSIUM SERPL-SCNC: 4.1 MMOL/L (ref 3.5–5.3)
PROT SERPL-MCNC: 7.3 G/DL (ref 6.4–8.2)
PROT UR STRIP-MCNC: NEGATIVE MG/DL
RBC # BLD AUTO: 4.55 MILLION/UL (ref 3.81–5.12)
RBC #/AREA URNS AUTO: NORMAL /HPF
SODIUM SERPL-SCNC: 140 MMOL/L (ref 136–145)
SP GR UR STRIP.AUTO: 1.01 (ref 1–1.03)
UROBILINOGEN UR QL STRIP.AUTO: 0.2 E.U./DL
WBC # BLD AUTO: 6.15 THOUSAND/UL (ref 4.31–10.16)
WBC #/AREA URNS AUTO: NORMAL /HPF

## 2018-02-22 PROCEDURE — 81002 URINALYSIS NONAUTO W/O SCOPE: CPT

## 2018-02-22 PROCEDURE — 83690 ASSAY OF LIPASE: CPT | Performed by: EMERGENCY MEDICINE

## 2018-02-22 PROCEDURE — 81025 URINE PREGNANCY TEST: CPT

## 2018-02-22 PROCEDURE — 96374 THER/PROPH/DIAG INJ IV PUSH: CPT

## 2018-02-22 PROCEDURE — 80053 COMPREHEN METABOLIC PANEL: CPT | Performed by: EMERGENCY MEDICINE

## 2018-02-22 PROCEDURE — 81001 URINALYSIS AUTO W/SCOPE: CPT

## 2018-02-22 PROCEDURE — 99284 EMERGENCY DEPT VISIT MOD MDM: CPT

## 2018-02-22 PROCEDURE — 96375 TX/PRO/DX INJ NEW DRUG ADDON: CPT

## 2018-02-22 PROCEDURE — 36415 COLL VENOUS BLD VENIPUNCTURE: CPT | Performed by: EMERGENCY MEDICINE

## 2018-02-22 PROCEDURE — 85025 COMPLETE CBC W/AUTO DIFF WBC: CPT | Performed by: EMERGENCY MEDICINE

## 2018-02-22 PROCEDURE — 96361 HYDRATE IV INFUSION ADD-ON: CPT

## 2018-02-22 RX ORDER — IBUPROFEN 600 MG/1
600 TABLET ORAL EVERY 6 HOURS PRN
Qty: 30 TABLET | Refills: 0 | Status: SHIPPED | OUTPATIENT
Start: 2018-02-22 | End: 2018-05-21

## 2018-02-22 RX ORDER — DICYCLOMINE HCL 20 MG
20 TABLET ORAL ONCE
Qty: 1 TABLET | Refills: 0 | Status: SHIPPED | OUTPATIENT
Start: 2018-02-22 | End: 2018-04-04 | Stop reason: ALTCHOICE

## 2018-02-22 RX ORDER — DICYCLOMINE HCL 20 MG
20 TABLET ORAL ONCE
Status: COMPLETED | OUTPATIENT
Start: 2018-02-22 | End: 2018-02-22

## 2018-02-22 RX ORDER — KETOROLAC TROMETHAMINE 30 MG/ML
15 INJECTION, SOLUTION INTRAMUSCULAR; INTRAVENOUS ONCE
Status: COMPLETED | OUTPATIENT
Start: 2018-02-22 | End: 2018-02-22

## 2018-02-22 RX ORDER — ONDANSETRON 2 MG/ML
4 INJECTION INTRAMUSCULAR; INTRAVENOUS ONCE
Status: COMPLETED | OUTPATIENT
Start: 2018-02-22 | End: 2018-02-22

## 2018-02-22 RX ADMIN — KETOROLAC TROMETHAMINE 15 MG: 30 INJECTION, SOLUTION INTRAMUSCULAR at 09:13

## 2018-02-22 RX ADMIN — SODIUM CHLORIDE 1000 ML: 0.9 INJECTION, SOLUTION INTRAVENOUS at 09:13

## 2018-02-22 RX ADMIN — DICYCLOMINE HYDROCHLORIDE 20 MG: 20 TABLET ORAL at 09:01

## 2018-02-22 RX ADMIN — ONDANSETRON 4 MG: 2 INJECTION INTRAMUSCULAR; INTRAVENOUS at 09:14

## 2018-02-22 NOTE — DISCHARGE INSTRUCTIONS
Abdominal Pain, Ambulatory Care   GENERAL INFORMATION:   Abdominal pain  can be dull, achy, or sharp  You may have pain in one area of your abdomen, or in your entire abdomen  Your pain may be caused by constipation, food sensitivity or poisoning, infection, or a blockage  Abdominal pain can also be caused by a hernia, appendicitis, or an ulcer  The cause of your abdominal pain may be unknown  Seek immediate care for the following symptoms:   · New chest pain or shortness of breath    · Pulsing pain in your upper abdomen or lower back that suddenly becomes constant    · Pain in the right lower abdominal area that worsens with movement    · Fever over 100 4°F (38°C) or shaking chills    · Vomiting and you cannot keep food or fluids down    · Pain does not improve or gets worse over the next 8 to 12 hours    · Blood in your vomit or bowel movements, or they look black and tarry    · Skin or the whites of your eyes turn yellow    · Large amount of vaginal bleeding that is not your monthly period  Treatment for abdominal pain  may include medicine to calm your stomach, prevent vomiting, or decrease pain  Follow up with your healthcare provider as directed:  Write down your questions so you remember to ask them during your visits  CARE AGREEMENT:   You have the right to help plan your care  Learn about your health condition and how it may be treated  Discuss treatment options with your caregivers to decide what care you want to receive  You always have the right to refuse treatment  The above information is an  only  It is not intended as medical advice for individual conditions or treatments  Talk to your doctor, nurse or pharmacist before following any medical regimen to see if it is safe and effective for you  © 2014 [address] is for End User's use only and may not be sold, redistributed or otherwise used for commercial purposes   All illustrations and images included in [facility] are the copyrighted property of A D [employer] M [employer]  or Otilio Huffman  Enteritis   AMBULATORY CARE:   Enteritis  is inflammation of the small intestine  It may be caused by eating foods or drinking liquids contaminated with a virus, bacteria, or parasites  It may also be caused by certain medicines, damage from radiation, and medical conditions such as Crohn disease  Common signs and symptoms include the following:   · Diarrhea    · Blood or mucus in your bowel movements    · Nausea and vomiting    · Fever    · Abdominal pain  Seek care immediately if:   · You cannot stop vomiting  · You have not urinated for 12 hours  Contact your healthcare provider if:   · You have a fever over 101 5  · You have blood or mucus in your bowel movements  · You continue to vomit or have diarrhea for more than 3 days, even after treatment  · You have a dry mouth and eyes, you are urinating less than usual, and you feel dizzy when you stand up  · Your mouth or eyes are dry  You are not urinating as much or as often  · You are losing weight without trying  · You have questions or concerns about your condition or care  Treatment for enteritis  depends on the cause  Enteritis may get better on its own, or you may need any of the following to treat and manage enteritis:  · Medicines  may be given to fight an infection caused by bacteria or a parasite  You may also need medicines to slow or stop your diarrhea or vomiting  Do not take these medicines unless your healthcare provider say it is okay  Other medicines may be needed to treat medical conditions that are causing enteritis  · Eat foods that help to decrease symptoms  Limit or avoid foods and liquids that are high in sugar, fat, and fiber to help relieve diarrhea  It may be helpful to avoid lactose  Lactose is a type of sugar that is found in milk products   You may be able to tolerate soups, broths, well-cooked vegetables, canned fruit, and baked or broiled meats  Ask your dietitian or healthcare provider if you should follow a special diet  You may need to avoid other foods if you have certain medical conditions such as celiac disease  · Drink liquids as directed  Ask how much liquid to drink each day and which liquids are best for you  It is important to prevent or treat dehydration  Even if you have been vomiting, suck on ice chips or take small sips of clear liquids often  Slowly increase the amount of clear liquids you drink  If you become dehydrated, you may need IV liquids  · Drink an oral rehydration solution (ORS) as directed  An ORS contains water, salts, and sugar that are needed to replace lost body fluids  Ask what kind of ORS to use, how much to drink, and where to get it  Prevent enteritis:  Enteritis that is caused by bacteria, parasites, or viruses can be prevented  The following may help to prevent this type of enteritis:  · Wash your hands often  Use soap and water  Wash your hands after you use the bathroom, change a child's diapers, or sneeze  Wash your hands before you prepare or eat food  · Clean surfaces and do laundry often  Wash your clothes and towels separately from the rest of the laundry  Clean surfaces in your home with antibacterial  or bleach  · Clean food thoroughly and cook safely  Wash raw vegetables before you cook  Cook meat, fish, and eggs fully  Do not use the same dishes for raw meat as you do for other foods  Refrigerate any leftover food immediately  · Be aware when you camp or travel  Drink only clean water  Do not drink from rivers or lakes unless you purify or boil the water first  When you travel, drink bottled water and do not add ice  Do not eat fruit that has not been peeled  Do not eat raw fish or meat that is not fully cooked    © 2017 [address] Information is for End User's use only and may not be sold, redistributed or otherwise used for commercial purposes  All illustrations and images included in [employer]Notes® are the copyrighted property of [employer] M , [employer]  or Otilio Huffman  The above information is an  only  It is not intended as medical advice for individual conditions or treatments  Talk to your doctor, nurse or pharmacist before following any medical regimen to see if it is safe and effective for you

## 2018-02-22 NOTE — ED PROVIDER NOTES
History obtained from patient  History  Chief Complaint   Patient presents with    Abdominal Pain     stated with abdominal pain  and started with diarrhea this am x4 since 530  HPI   patient is here with abdominal pain  The patient started with abdominal pain on   On Saturday, she had nausea, and on  she developed abdominal pain  She states the pain is constant, with sharp stabbing events that cause her to be doubled over in pain  She states that when the pain gets more severe, she cannot do anything  The patient has not had vomiting  The pain has been constant since that time, with no exacerbating or alleviating factors  The patient has been eating a little, but not much  She states during that time she was able to move her bowels with no difficulty, and was passing gas  The patient denies urinary symptoms, vaginal discharge, and states she is not sexually active  She has never had pain like this before  She does not know sick contacts  She thought it might be food poisoning  The patient has no recent travel  She denies any rashes  The patient has a prior surgical history of  only  She has no other significant medical history  The patient develops diarrhea last night, and had 4 episodes of watery diarrhea during the night  She states that she actually feels a little better since beginning the diarrhea, but continues with pain  The patient's last period was 2 weeks ago and was on time  Prior to Admission Medications   Prescriptions Last Dose Informant Patient Reported? Taking?   ibuprofen (MOTRIN) 400 mg tablet   Yes No   Sig: Take by mouth every 6 (six) hours as needed for mild pain      Facility-Administered Medications: None       History reviewed  No pertinent past medical history  Past Surgical History:   Procedure Laterality Date     SECTION         History reviewed  No pertinent family history    I have reviewed and agree with the history as documented  Social History   Substance Use Topics    Smoking status: Never Smoker    Smokeless tobacco: Never Used    Alcohol use No        Review of Systems  The patient's review of systems is otherwise negative in 12 systems were reviewed  She is up-to-date on all immunizations  Physical Exam  ED Triage Vitals [02/22/18 0811]   Temperature Pulse Respirations Blood Pressure SpO2   97 6 °F (36 4 °C) 71 18 109/64 100 %      Temp Source Heart Rate Source Patient Position - Orthostatic VS BP Location FiO2 (%)   Oral Monitor Sitting Left arm --      Pain Score       8           Orthostatic Vital Signs  Vitals:    02/22/18 0811   BP: 109/64   Pulse: 71   Patient Position - Orthostatic VS: Sitting       Physical Exam    The patient has normal vital signs  Her HEENT exam is unremarkable  There is no scleral icterus  Her conjunctiva are moist and pink  Oropharynx is clear with moist mucous membranes  Her neck is supple and nontender with no adenopathy  Her heart is regular without murmurs, rubs, or gallops  Her lungs are clear and equal with no wheezes, rales, rhonchi  Her abdomen is soft and diffusely tender, she is not guarding is not distended, there are no masses, there is no rebound  She is most tender in her epigastrium  Patient's extremities are warm and well perfused  Her back exam is normal with normal skin  She has no CVA tenderness  She is neurologically intact    ED Medications  Medications   sodium chloride 0 9 % bolus 1,000 mL (not administered)   ketorolac (TORADOL) injection 15 mg (not administered)   dicyclomine (BENTYL) tablet 20 mg (not administered)   ondansetron (ZOFRAN) injection 4 mg (not administered)       Diagnostic Studies  Results Reviewed     Procedure Component Value Units Date/Time    POCT pregnancy, urine [[id number]]  (Normal) Resulted:  02/22/18 0855    Lab Status:  Final result Updated:  02/22/18 0855     EXT PREG TEST UR (Ref: Negative) Negative    POCT urinalysis dipstick [[id number]]  (Abnormal) Resulted:  02/22/18 0849    Lab Status:  Final result Specimen:  Urine from Urine, Other Updated:  02/22/18 0855    CBC and differential [[id number]]     Lab Status:  No result Specimen:  Blood     Comprehensive metabolic panel [[id number]]     Lab Status:  No result Specimen:  Blood     Lipase [[id number]]     Lab Status:  No result Specimen:  Blood     Urine Microscopic [[id number]] Collected:  02/22/18 0854    Lab Status: In process Specimen:  Urine from Urine, Clean Catch Updated:  02/22/18 0853    ED Urine Macroscopic [[id number]]  (Abnormal) Collected:  02/22/18 0854    Lab Status:  Final result Specimen:  Urine Updated:  02/22/18 0849     Color, UA Yellow     Clarity, UA Clear     pH, UA 5 5     Leukocytes, UA Negative     Nitrite, UA Negative     Protein, UA Negative mg/dl      Glucose, UA Negative mg/dl      Ketones, UA Negative mg/dl      Urobilinogen, UA 0 2 E U /dl      Bilirubin, UA Negative     Blood, UA Small (A)     Specific [place], UA 1 010    Narrative:       CLINITEK RESULT                 No orders to display              Procedures  Procedures       Phone Contacts  ED Phone Contact    ED Course  ED Course as of Feb 22 1519   Thu Feb 22, 2018   4144   Patient is feeling better, IV fluids infusing, chatting on phone    1010  Patient reports that she is feeling "not so bad"  IV fluids still infusing  Patient with benign abdominal exam, normal vital signs  Discussed with patient's that she should return to the hospital for worsening pain, abdominal distention, intractable vomiting, or any other concerns  Discussed with patient that she should call her doctor tomorrow to arrange a follow-up appointment  Patient is comfortable with that plan  Will continue to give IV fluids and then discharge home  Patient informed that she has prescriptions that were written for her at her pharmacy                                MDM  Impression: Likely enteritis    Patient has diarrhea without vomiting, although she has crampy intermittent sharp pain, does not have significant risk factors for obstruction, and is not having any vomiting although she has been sick for 4 days  Patient states that she has been having a difficult time taking p o  For 4 days, so will check labs and give IV fluids  Will give Bentyl as well as Zofran and Toradol for her symptoms, as patient has taken antacids for her symptoms without relief  Will plan to reassess  CritCare Time    Disposition  Final diagnoses:   None     ED Disposition     None      Follow-up Information    None       Patient's Medications   Discharge Prescriptions    No medications on file     No discharge procedures on file      ED Provider  Electronically Signed by           Jean Michele MD  02/22/18 4513

## 2018-02-28 ENCOUNTER — HOSPITAL ENCOUNTER (OUTPATIENT)
Dept: RADIOLOGY | Facility: CLINIC | Age: 38
Discharge: HOME | End: 2018-02-28
Admitting: ANESTHESIOLOGY
Payer: OTHER MISCELLANEOUS

## 2018-02-28 VITALS
HEART RATE: 73 BPM | SYSTOLIC BLOOD PRESSURE: 101 MMHG | OXYGEN SATURATION: 100 % | TEMPERATURE: 97.1 F | DIASTOLIC BLOOD PRESSURE: 61 MMHG | RESPIRATION RATE: 18 BRPM

## 2018-02-28 DIAGNOSIS — M47.812 CERVICAL FACET SYNDROME: ICD-10-CM

## 2018-02-28 PROCEDURE — 64491 INJ PARAVERT F JNT C/T 2 LEV: CPT | Performed by: ANESTHESIOLOGY

## 2018-02-28 PROCEDURE — 64492 INJ PARAVERT F JNT C/T 3 LEV: CPT | Performed by: ANESTHESIOLOGY

## 2018-02-28 PROCEDURE — 64490 INJ PARAVERT F JNT C/T 1 LEV: CPT | Performed by: ANESTHESIOLOGY

## 2018-02-28 RX ORDER — LIDOCAINE HYDROCHLORIDE 10 MG/ML
5 INJECTION, SOLUTION EPIDURAL; INFILTRATION; INTRACAUDAL; PERINEURAL ONCE
Status: COMPLETED | OUTPATIENT
Start: 2018-02-28 | End: 2018-02-28

## 2018-02-28 RX ADMIN — LIDOCAINE HYDROCHLORIDE 4 ML: 10 INJECTION, SOLUTION EPIDURAL; INFILTRATION; INTRACAUDAL; PERINEURAL at 10:00

## 2018-02-28 RX ADMIN — LIDOCAINE HYDROCHLORIDE 2 ML: 20 INJECTION, SOLUTION EPIDURAL; INFILTRATION; INTRACAUDAL; PERINEURAL at 10:00

## 2018-02-28 NOTE — DISCHARGE INSTRUCTIONS
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
## 2018-03-09 ENCOUNTER — TELEPHONE (OUTPATIENT)
Dept: PAIN MEDICINE | Facility: CLINIC | Age: 38
End: 2018-03-09

## 2018-03-09 NOTE — TELEPHONE ENCOUNTER
Eden Knott, Nurse CM called and lm regarding pt's next step for an ablation  Would like a call back at [phone]

## 2018-03-09 NOTE — TELEPHONE ENCOUNTER
The pain diary is not mi basket, nor is it scanned into the media tab in epic  Did you receive a copy of her pain diary?

## 2018-03-09 NOTE — TELEPHONE ENCOUNTER
S/W Lily Shah  She stated the pt sent back the pain dairy and is wondering what the next step is  Advised her will ask Dr Jaclyn Lee if he received and reviewed the pain dairy to determine the next step  She verbalized understanding  Please advise

## 2018-03-12 NOTE — TELEPHONE ENCOUNTER
The patient had a favorable response to medial branch block 1     Please call the patient to schedule left C3, C4, C5, and C6 medial branch block 2

## 2018-03-12 NOTE — TELEPHONE ENCOUNTER
We did not receive the pain dairy- called Nurse JERICA Espinal, she said the pt faxed it in  I looked through media also and cannot find anything scanned incorrectly  Jose Alberto Mosqueda to have pt refax the form ASAP to fax # [phone] so it comes directly to our office

## 2018-03-13 NOTE — TELEPHONE ENCOUNTER
Called pt, LMOM- please schedule follow up visit anytime between 5/19/18- 5/30/18 for 6-8wk f/u after inj

## 2018-03-13 NOTE — TELEPHONE ENCOUNTER
Called pt and adjustor, per your office note, pt has had this RFA before so you were just going to do one MBB  Can you please confirm that its ok to just schedule the RFA? *tentatively scheduled the RFA on Wed 4/4 in [place] (soonest avail for RFA)  Also, pt wants to set up a follow up after the RFA, how many weeks out? Went over pre procedure instructions, NPO 1 hr prior, if sick or on abx needs to call to rs, wear loose, comf clothing- no buttons/zippers, needs   Pt verbalized understanding

## 2018-03-16 ENCOUNTER — APPOINTMENT (OUTPATIENT)
Dept: URGENT CARE | Age: 38
End: 2018-03-16
Payer: OTHER MISCELLANEOUS

## 2018-03-16 PROCEDURE — 99214 OFFICE O/P EST MOD 30 MIN: CPT | Performed by: PREVENTIVE MEDICINE

## 2018-04-04 ENCOUNTER — HOSPITAL ENCOUNTER (OUTPATIENT)
Dept: RADIOLOGY | Facility: CLINIC | Age: 38
Discharge: HOME | End: 2018-04-04
Admitting: ANESTHESIOLOGY
Payer: OTHER MISCELLANEOUS

## 2018-04-04 ENCOUNTER — TELEPHONE (OUTPATIENT)
Dept: RADIOLOGY | Facility: CLINIC | Age: 38
End: 2018-04-04

## 2018-04-04 VITALS
SYSTOLIC BLOOD PRESSURE: 97 MMHG | RESPIRATION RATE: 18 BRPM | HEART RATE: 80 BPM | OXYGEN SATURATION: 99 % | TEMPERATURE: 97.1 F | DIASTOLIC BLOOD PRESSURE: 62 MMHG

## 2018-04-04 DIAGNOSIS — M47.812 CERVICAL FACET SYNDROME: ICD-10-CM

## 2018-04-04 PROCEDURE — 64633 DESTROY CERV/THOR FACET JNT: CPT | Performed by: ANESTHESIOLOGY

## 2018-04-04 PROCEDURE — 64634 DESTROY C/TH FACET JNT ADDL: CPT | Performed by: ANESTHESIOLOGY

## 2018-04-04 RX ORDER — BUPIVACAINE HYDROCHLORIDE 5 MG/ML
30 INJECTION, SOLUTION EPIDURAL; INTRACAUDAL ONCE
Status: COMPLETED | OUTPATIENT
Start: 2018-04-04 | End: 2018-04-04

## 2018-04-04 RX ORDER — LIDOCAINE HYDROCHLORIDE 10 MG/ML
10 INJECTION, SOLUTION EPIDURAL; INFILTRATION; INTRACAUDAL; PERINEURAL ONCE
Status: COMPLETED | OUTPATIENT
Start: 2018-04-04 | End: 2018-04-04

## 2018-04-04 RX ADMIN — BUPIVACAINE HYDROCHLORIDE 4 ML: 5 INJECTION, SOLUTION EPIDURAL; INTRACAUDAL at 10:49

## 2018-04-04 RX ADMIN — LIDOCAINE HYDROCHLORIDE 5 ML: 10 INJECTION, SOLUTION EPIDURAL; INFILTRATION; INTRACAUDAL; PERINEURAL at 10:49

## 2018-04-04 RX ADMIN — LIDOCAINE HYDROCHLORIDE 4 ML: 20 INJECTION, SOLUTION EPIDURAL; INFILTRATION; INTRACAUDAL; PERINEURAL at 10:50
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
## 2018-04-04 NOTE — TELEPHONE ENCOUNTER
**To be called on 4/5/18  S/p Left C3, C4, C5, C6 RFA on 4/4/18  Follow up appt scheduled with Dr Ildefonso Avelar on 5/21 to arrive at 1:30 pm

## 2018-04-04 NOTE — DISCHARGE INSTRUCTIONS

## 2018-04-05 NOTE — TELEPHONE ENCOUNTER
**FYI**    S/w pt s/p RFA on 4/4/18, pt said she is doing very well, the pain is pretty much gone  Pt reports no s/s of infection at this injection sites and no fevers  Pt reports no sunburn like sensation at the site  Confirmed f/u appt with pt and instructed her to call if she has any issues

## 2018-04-16 ENCOUNTER — APPOINTMENT (OUTPATIENT)
Dept: URGENT CARE | Age: 38
End: 2018-04-16
Payer: OTHER MISCELLANEOUS

## 2018-04-16 PROCEDURE — 99214 OFFICE O/P EST MOD 30 MIN: CPT | Performed by: PREVENTIVE MEDICINE

## 2018-05-07 ENCOUNTER — APPOINTMENT (OUTPATIENT)
Dept: URGENT CARE | Age: 38
End: 2018-05-07
Payer: OTHER MISCELLANEOUS

## 2018-05-07 PROCEDURE — 99214 OFFICE O/P EST MOD 30 MIN: CPT | Performed by: PREVENTIVE MEDICINE

## 2018-05-21 ENCOUNTER — CLINICAL SUPPORT (OUTPATIENT)
Dept: PAIN MEDICINE | Facility: CLINIC | Age: 38
End: 2018-05-21
Payer: OTHER MISCELLANEOUS

## 2018-05-21 VITALS
SYSTOLIC BLOOD PRESSURE: 106 MMHG | HEIGHT: 62 IN | BODY MASS INDEX: 25.1 KG/M2 | DIASTOLIC BLOOD PRESSURE: 67 MMHG | HEART RATE: 65 BPM | WEIGHT: 136.4 LBS | TEMPERATURE: 97.8 F

## 2018-05-21 DIAGNOSIS — G89.4 CHRONIC PAIN DISORDER: ICD-10-CM

## 2018-05-21 DIAGNOSIS — M54.2 CERVICAL PAIN (NECK): ICD-10-CM

## 2018-05-21 DIAGNOSIS — M47.812 CERVICAL FACET SYNDROME: ICD-10-CM

## 2018-05-21 DIAGNOSIS — M79.18 CERVICAL MYOFASCIAL PAIN SYNDROME: ICD-10-CM

## 2018-05-21 DIAGNOSIS — M79.18 MYOFASCIAL PAIN: Primary | ICD-10-CM

## 2018-05-21 PROCEDURE — 99214 OFFICE O/P EST MOD 30 MIN: CPT | Performed by: ANESTHESIOLOGY

## 2018-05-21 RX ORDER — MELOXICAM 7.5 MG/1
7.5 TABLET ORAL DAILY
Qty: 60 TABLET | Refills: 2 | Status: SHIPPED | OUTPATIENT
Start: 2018-05-21 | End: 2022-04-07

## 2018-05-21 RX ORDER — METHOCARBAMOL 500 MG/1
500 TABLET, FILM COATED ORAL 3 TIMES DAILY PRN
Qty: 90 TABLET | Refills: 2 | Status: SHIPPED | OUTPATIENT
Start: 2018-05-21 | End: 2022-04-07
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
## 2018-05-30 ENCOUNTER — APPOINTMENT (OUTPATIENT)
Dept: URGENT CARE | Age: 38
End: 2018-05-30
Payer: OTHER MISCELLANEOUS

## 2018-05-30 PROCEDURE — 99214 OFFICE O/P EST MOD 30 MIN: CPT | Performed by: PREVENTIVE MEDICINE

## 2018-06-15 ENCOUNTER — APPOINTMENT (OUTPATIENT)
Dept: URGENT CARE | Age: 38
End: 2018-06-15
Payer: OTHER MISCELLANEOUS

## 2018-06-15 PROCEDURE — 99214 OFFICE O/P EST MOD 30 MIN: CPT | Performed by: PREVENTIVE MEDICINE

## 2018-12-13 ENCOUNTER — HOSPITAL ENCOUNTER (EMERGENCY)
Facility: HOSPITAL | Age: 38
Discharge: HOME | End: 2018-12-13
Payer: COMMERCIAL

## 2018-12-13 VITALS
HEART RATE: 72 BPM | HEIGHT: 62 IN | RESPIRATION RATE: 20 BRPM | BODY MASS INDEX: 25.4 KG/M2 | DIASTOLIC BLOOD PRESSURE: 62 MMHG | TEMPERATURE: 97.5 F | SYSTOLIC BLOOD PRESSURE: 101 MMHG | OXYGEN SATURATION: 100 % | WEIGHT: 138 LBS

## 2018-12-13 DIAGNOSIS — J06.9 VIRAL URI WITH COUGH: Primary | ICD-10-CM

## 2018-12-13 LAB — S PYO AG THROAT QL: NEGATIVE

## 2018-12-13 PROCEDURE — 99283 EMERGENCY DEPT VISIT LOW MDM: CPT

## 2018-12-13 PROCEDURE — 87430 STREP A AG IA: CPT | Performed by: PHYSICIAN ASSISTANT

## 2018-12-13 RX ORDER — GUAIFENESIN/DEXTROMETHORPHAN 100-10MG/5
10 SYRUP ORAL ONCE
Status: COMPLETED | OUTPATIENT
Start: 2018-12-13 | End: 2018-12-13

## 2018-12-13 RX ORDER — BENZONATATE 100 MG/1
100 CAPSULE ORAL 3 TIMES DAILY PRN
Qty: 20 CAPSULE | Refills: 0 | Status: SHIPPED | OUTPATIENT
Start: 2018-12-13 | End: 2022-04-07

## 2018-12-13 RX ADMIN — GUAIFENESIN AND DEXTROMETHORPHAN 10 ML: 100; 10 SYRUP ORAL at 13:27

## 2018-12-13 NOTE — ED PROVIDER NOTES
History  Chief Complaint   Patient presents with    Cough     pt started with intermmittent productive cough two weeks ago and states it went away and now is back  pt denies v/d  Patient reports that she was sick with a cough a few weeks ago, it went away, however it did come back this past weekend  She reports that she has been taking over-the-counter cough medicine with minimal relief  She reports that she has not measured a temperature, however she has been feeling warm  Denies ear pain or runny nose  Does have a sore throat which she thinks is from coughing  She is nonsmoker        Cough   Cough characteristics:  Non-productive  Sputum characteristics:  Nondescript  Severity:  Moderate  Onset quality:  Gradual  Duration:  4 days  Timing:  Constant  Progression:  Unchanged  Chronicity:  Recurrent  Smoker: no    Context: upper respiratory infection    Relieved by:  Cough suppressants  Worsened by:  Nothing  Ineffective treatments:  Cough suppressants  Associated symptoms: chest pain (with coughing) and sore throat    Associated symptoms: no fever, no headaches and no shortness of breath        Prior to Admission Medications   Prescriptions Last Dose Informant Patient Reported? Taking?   meloxicam (MOBIC) 7 5 mg tablet   No No   Sig: Take 1 tablet (7 5 mg total) by mouth daily   methocarbamol (ROBAXIN) 500 mg tablet   No No   Sig: Take 1 tablet (500 mg total) by mouth 3 (three) times a day as needed for muscle spasms      Facility-Administered Medications: None       History reviewed  No pertinent past medical history  Past Surgical History:   Procedure Laterality Date     SECTION         History reviewed  No pertinent family history  I have reviewed and agree with the history as documented      Social History   Substance Use Topics    Smoking status: Never Smoker    Smokeless tobacco: Never Used    Alcohol use No        Review of Systems   Constitutional: Negative for activity change and fever    HENT: Positive for sore throat  Eyes: Negative for visual disturbance  Respiratory: Positive for cough  Negative for shortness of breath  Cardiovascular: Positive for chest pain (with coughing)  Musculoskeletal: Negative for back pain  Skin: Negative for color change  Neurological: Negative for dizziness and headaches  Psychiatric/Behavioral: Negative for behavioral problems  Physical Exam  Physical Exam   Constitutional: She is oriented to person, place, and time  She appears well-developed and well-nourished  No distress  HENT:   Head: Normocephalic and atraumatic  Right Ear: Tympanic membrane, external ear and ear canal normal    Left Ear: Tympanic membrane, external ear and ear canal normal    Nose: Nose normal    Mouth/Throat: Uvula is midline and mucous membranes are normal  No oral lesions  Posterior oropharyngeal erythema present  No oropharyngeal exudate, posterior oropharyngeal edema or tonsillar abscesses  Tonsils are 2+ on the right  Tonsils are 2+ on the left  No tonsillar exudate  Eyes: Conjunctivae and EOM are normal    Cardiovascular: Normal rate and regular rhythm  Pulmonary/Chest: Effort normal and breath sounds normal  No respiratory distress  She has no decreased breath sounds  She has no wheezes  She has no rhonchi  She has no rales  Dry cough   Musculoskeletal: Normal range of motion  Neurological: She is alert and oriented to person, place, and time  Skin: Skin is warm and dry  No rash noted  Psychiatric: She has a normal mood and affect   Her behavior is normal        Vital Signs  ED Triage Vitals [12/13/18 1240]   Temperature Pulse Respirations Blood Pressure SpO2   97 5 °F (36 4 °C) 72 20 101/62 100 %      Temp Source Heart Rate Source Patient Position - Orthostatic VS BP Location FiO2 (%)   Tympanic Monitor Sitting Left arm --      Pain Score       --           Vitals:    12/13/18 1240   BP: 101/62   Pulse: 72   Patient Position - Orthostatic VS: Sitting       Visual Acuity      ED Medications  Medications   dextromethorphan-guaiFENesin (ROBITUSSIN DM)  mg/5 mL oral syrup 10 mL (10 mL Oral Given 12/13/18 1327)       Diagnostic Studies  Results Reviewed     Procedure Component Value Units Date/Time    Rapid Strep A Screen Only, Adults [[id number]]  (Normal) Collected:  12/13/18 1302    Lab Status:  Final result Specimen:  Throat from Throat Updated:  12/13/18 1339     Rapid Strep A Screen Negative                 No orders to display              Procedures  Procedures       Phone Contacts  ED Phone Contact    ED Course                               MDM  CritCare Time    Disposition  Final diagnoses:   Viral URI with cough     Time reflects when diagnosis was documented in both MDM as applicable and the Disposition within this note     Time User Action Codes Description Comment    12/13/2018  1:42 PM Michelle Iverson [J06 [id number],  B97 [id number]] Viral URI with cough       ED Disposition     ED Disposition Condition Comment    Discharge  Karine Rizzo discharge to home/self care  Condition at discharge: Good        Follow-up Information     Follow up With Specialties Details Why Contact Info Additional [facility]   [address]  [facility] [id number]       [address] [place] [address] Emergency Department Emergency Medicine  As needed, If symptoms worsen [address]  [phone]  ED, [address] [place] [address], [phone]          Patient's Medications   Discharge Prescriptions    BENZONATATE (TESSALON PERLES) 100 MG CAPSULE    Take 1 capsule (100 mg total) by mouth 3 (three) times a day as needed for cough       Start Date: 12/13/2018End Date: --       Order Dose: 100 mg       Quantity: 20 capsule    Refills: 0     No discharge procedures on file      ED Provider  Electronically Signed by           Maryann Mccormick PA-C  12/13/18 6057

## 2018-12-13 NOTE — DISCHARGE INSTRUCTIONS
Upper Respiratory Infection, Ambulatory Care   GENERAL INFORMATION:   An upper respiratory infection  is also called a common cold  It can affect your nose, throat, ears, and sinuses  Common symptoms include the following:   · Runny or stuffy nose    · Sneezing and coughing    · Sore throat or hoarseness    · Red, watery, and sore eyes    · Tiredness or restlessness    · Chills and fever    · Headache, body aches, or sore muscles  Seek immediate care for the following symptoms:   · Headaches or a stiff neck    · Bright lights hurt your eyes    · Chest pain or trouble breathing  Treatment for an upper respiratory infection  may include any of the following:  · Decongestants  help decrease nasal congestion and improve your breathing  Do not use decongestant sprays for more than a few days  · Cough suppressants  help decrease coughing  Ask your healthcare provider which type of cough medicine is best for you  Some cough medicines need a doctor's order  · NSAIDs  help decrease swelling and pain or fever  This medicine is available with or without a doctor's order  NSAIDs can cause stomach bleeding or kidney problems in certain people  If you take blood thinner medicine, always ask your healthcare provider if NSAIDs are safe for you  Always read the medicine label and follow directions  Care for an upper respiratory infection:   · Rest  until your fever is gone or you feel better  · Drink liquids as directed to prevent dehydration  You may need to drink 8 to 10 cups of liquid each day  Good liquids to drink include water, ginger ale, tea, or fruit juices  · Gargle  with warm salt water to help your sore throat feel better  Mix ¼ teaspoon salt with 1 cup warm water  You may also suck on hard candy or throat lozenges  · Saline nasal drops  help loosen your nasal congestion  They can be bought without a doctor's order      · Take a warm bath or shower  to help decrease body aches and help you breathe easier  · Use a cool-mist humidifier  to increase air moisture and make it easier for you to breathe  Prevent the spread of germs:   · Avoid others for the first 2 to 3 days of your cold  Germs are easily spread during this time  · Do not share food, drinks,  towels, or personal items with others  · Wash your hands often  Use soap and water  Wash your hands after you use the bathroom, change a child's diapers, or sneeze  Wash your hands before you prepare or eat food  Cover your mouth and nose with a tissue when you sneeze or cough  Follow up with your healthcare provider as directed:  Write down your questions so you remember to ask them during your visits  CARE AGREEMENT:   You have the right to help plan your care  Learn about your health condition and how it may be treated  Discuss treatment options with your caregivers to decide what care you want to receive  You always have the right to refuse treatment  The above information is an  only  It is not intended as medical advice for individual conditions or treatments  Talk to your doctor, nurse or pharmacist before following any medical regimen to see if it is safe and effective for you  © 2014 [address] is for End User's use only and may not be sold, redistributed or otherwise used for commercial purposes  All illustrations and images included in CareNotes® are the copyrighted property of [employer]  or Otilio Huffman

## 2019-02-28 ENCOUNTER — TELEPHONE (OUTPATIENT)
Dept: PAIN MEDICINE | Facility: CLINIC | Age: 39
End: 2019-02-28

## 2019-03-22 ENCOUNTER — TELEPHONE (OUTPATIENT)
Dept: PAIN MEDICINE | Facility: CLINIC | Age: 39
End: 2019-03-22

## 2019-05-25 ENCOUNTER — HOSPITAL ENCOUNTER (EMERGENCY)
Facility: HOSPITAL | Age: 39
Discharge: HOME | End: 2019-05-26
Attending: EMERGENCY MEDICINE

## 2019-05-25 DIAGNOSIS — M25.50 POLYARTHRALGIA: Primary | ICD-10-CM

## 2019-05-25 PROCEDURE — 99283 EMERGENCY DEPT VISIT LOW MDM: CPT | Performed by: EMERGENCY MEDICINE

## 2019-05-25 PROCEDURE — 99283 EMERGENCY DEPT VISIT LOW MDM: CPT

## 2019-05-26 ENCOUNTER — APPOINTMENT (EMERGENCY)
Dept: RADIOLOGY | Facility: HOSPITAL | Age: 39
End: 2019-05-26

## 2019-05-26 VITALS
DIASTOLIC BLOOD PRESSURE: 66 MMHG | WEIGHT: 138 LBS | HEART RATE: 64 BPM | OXYGEN SATURATION: 100 % | TEMPERATURE: 97.9 F | RESPIRATION RATE: 16 BRPM | BODY MASS INDEX: 25.24 KG/M2 | SYSTOLIC BLOOD PRESSURE: 114 MMHG

## 2019-05-26 PROCEDURE — 73110 X-RAY EXAM OF WRIST: CPT

## 2019-05-26 RX ADMIN — DICLOFENAC 2 G: 10 GEL TOPICAL at 02:17

## 2021-04-08 DIAGNOSIS — B36.0 TINEA VERSICOLOR: Primary | ICD-10-CM

## 2021-04-08 PROCEDURE — NC001 PR NO CHARGE

## 2021-04-08 RX ORDER — KETOCONAZOLE 20 MG/ML
SHAMPOO TOPICAL
Qty: 120 ML | Refills: 0 | Status: SHIPPED | OUTPATIENT
Start: 2021-04-08 | End: 2022-04-07

## 2022-04-05 ENCOUNTER — TELEPHONE (OUTPATIENT)
Dept: OTHER | Facility: OTHER | Age: 42
End: 2022-04-05

## 2022-04-05 NOTE — TELEPHONE ENCOUNTER
Maribell would like to know who to make a check for the patient physical on 4/7/2022 payable to   Please call with this billing information request

## 2022-04-05 NOTE — TELEPHONE ENCOUNTER
Pt advised that she can address it to [facility] or leave it blank and we will stamp it at the time of the visit

## 2022-04-07 ENCOUNTER — OFFICE VISIT (OUTPATIENT)
Dept: FAMILY MEDICINE CLINIC | Facility: CLINIC | Age: 42
End: 2022-04-07

## 2022-04-07 VITALS
BODY MASS INDEX: 24.91 KG/M2 | TEMPERATURE: 98 F | HEIGHT: 63 IN | OXYGEN SATURATION: 99 % | DIASTOLIC BLOOD PRESSURE: 70 MMHG | HEART RATE: 77 BPM | WEIGHT: 140.6 LBS | SYSTOLIC BLOOD PRESSURE: 110 MMHG | RESPIRATION RATE: 16 BRPM

## 2022-04-07 DIAGNOSIS — Z12.31 VISIT FOR SCREENING MAMMOGRAM: ICD-10-CM

## 2022-04-07 DIAGNOSIS — Z00.00 ANNUAL PHYSICAL EXAM: Primary | ICD-10-CM

## 2022-04-07 PROCEDURE — 99385 PREV VISIT NEW AGE 18-39: CPT | Performed by: FAMILY MEDICINE

## 2022-04-07 NOTE — PROGRESS NOTES
[facility]    NAME: Leyda Hernandez  AGE: 46 y o  SEX: female  : 1980     DATE: 2022     Assessment and Plan:     Problem List Items Addressed This Visit     None      Visit Diagnoses     Annual physical exam    -  Primary    Relevant Orders    CBC and differential    Comprehensive metabolic panel    Lipid Panel with Direct LDL reflex    HIV 1/2 Antigen/Antibody (4th Generation) w Reflex [facility]    Hepatitis C antibody    TSH, 3rd generation with Free T4 reflex    Ambulatory Referral to Obstetrics / Gynecology    Visit for screening mammogram        Relevant Orders    Mammo screening bilateral w 3d & cad          Immunizations and preventive care screenings were discussed with patient today  Appropriate education was printed on patient's after visit summary  Counseling:  Alcohol/drug use: discussed moderation in alcohol intake, the recommendations for healthy alcohol use, and avoidance of illicit drug use  Dental Health: discussed importance of regular tooth brushing, flossing, and dental visits  Injury prevention: discussed safety/seat belts, safety helmets, smoke detectors, carbon dioxide detectors, and smoking near bedding or upholstery  Sexual health: discussed sexually transmitted diseases, partner selection, use of condoms, avoidance of unintended pregnancy, and contraceptive alternatives  · Exercise: the importance of regular exercise/physical activity was discussed  Recommend exercise 3-5 times per week for at least 30 minutes  BMI Counseling: Body mass index is 25 16 kg/m²  The BMI is above normal  Nutrition recommendations include decreasing portion sizes and encouraging healthy choices of fruits and vegetables  Exercise recommendations include moderate physical activity 150 minutes/week  No pharmacotherapy was ordered  Rationale for BMI follow-up plan is due to patient being overweight or obese  Depression Screening and Follow-up Plan: Patient was screened for depression during today's encounter  They screened negative with a PHQ-2 score of 2  No follow-ups on file  Chief Complaint:     Chief Complaint   Patient presents with    Physical Exam     Patient is here today for an annual exam       History of Present Illness:     Adult Annual Physical   Patient here for a comprehensive physical exam  The patient reports no problems  Diet and Physical Activity  · Diet/Nutrition: well balanced diet and consuming 3-5 servings of fruits/vegetables daily  · Exercise: walking  Depression Screening  PHQ-2/9 Depression Screening    Little interest or pleasure in doing things: 1 - several days  Feeling down, depressed, or hopeless: 1 - several days  PHQ-2 Score: 2  PHQ-2 Interpretation: Negative depression screen       General Health  · Sleep: sleeps well and gets 7-8 hours of sleep on average  · Hearing: normal - bilateral   · Vision: most recent eye exam >1 year ago  · Dental: no recent dental visit     /GYN Health  · Last menstrual period: 3 weeks ago  · Contraceptive method: none  · History of STDs?: no      Review of Systems:     Review of Systems   Constitutional: Negative  HENT: Negative  Eyes: Negative  Respiratory: Negative  Cardiovascular: Negative  Gastrointestinal: Negative  Endocrine: Negative  Genitourinary: Negative  Musculoskeletal: Negative  Skin: Negative  Allergic/Immunologic: Negative  Neurological: Negative  Hematological: Negative  Psychiatric/Behavioral: Negative  Past Medical History:     History reviewed  No pertinent past medical history     Past Surgical History:     Past Surgical History:   Procedure Laterality Date     SECTION        Social History:     Social History     Socioeconomic History    Marital status:      Spouse name: None    Number of children: None    Years of education: None    Highest education level: None   Occupational History    None   Tobacco Use    Smoking status: Never Smoker    Smokeless tobacco: Never Used   Substance and Sexual Activity    Alcohol use: No    Drug use: No    Sexual activity: Yes     Partners: Male   Other Topics Concern    None   Social History Narrative    None     Social Determinants of Health     Financial Resource Strain: Not on file   Food Insecurity: Not on file   Transportation Needs: Not on file   Physical Activity: Not on file   Stress: Not on file   Social Connections: Not on file   Intimate Partner Violence: Not on file   Housing Stability: Not on file      Family History:     History reviewed  No pertinent family history  Current Medications:     No current outpatient medications on file  No current facility-administered medications for this visit  Allergies:     No Known Allergies   Physical Exam:     /70 (BP Location: Left arm, Patient Position: Sitting, Cuff Size: Adult)   Pulse 77   Temp 98 °F (36 7 °C) (Tympanic)   Resp 16   Ht 5' 2 68" (1 592 m)   Wt 63 8 kg (140 lb 9 6 oz)   SpO2 99%   BMI 25 16 kg/m²     Physical Exam  Constitutional:       Appearance: She is well-developed  HENT:      Head: Normocephalic and atraumatic  Right Ear: Tympanic membrane normal       Left Ear: Tympanic membrane normal       Nose: Nose normal       Mouth/Throat:      Mouth: Mucous membranes are moist    Eyes:      Pupils: Pupils are equal, round, and reactive to light  Cardiovascular:      Rate and Rhythm: Normal rate and regular rhythm  Pulmonary:      Effort: Pulmonary effort is normal  No respiratory distress  Breath sounds: Normal breath sounds  No wheezing  Abdominal:      General: Bowel sounds are normal       Palpations: Abdomen is soft  Musculoskeletal:         General: No deformity  Normal range of motion  Cervical back: Normal range of motion and neck supple  Skin:     General: Skin is warm  Capillary Refill: Capillary refill takes less than 2 seconds  Neurological:      General: No focal deficit present  Mental Status: She is alert and oriented to person, place, and time     Psychiatric:         Mood and Affect: Mood normal          Behavior: Behavior normal           Ortega Patel MD   [facility]

## 2022-04-07 NOTE — PATIENT INSTRUCTIONS

## 2022-12-27 ENCOUNTER — HOSPITAL ENCOUNTER (EMERGENCY)
Facility: HOSPITAL | Age: 42
Discharge: HOME | End: 2022-12-27
Attending: EMERGENCY MEDICINE

## 2022-12-27 ENCOUNTER — APPOINTMENT (EMERGENCY)
Dept: RADIOLOGY | Facility: HOSPITAL | Age: 42
End: 2022-12-27

## 2022-12-27 VITALS
HEART RATE: 70 BPM | BODY MASS INDEX: 25.06 KG/M2 | TEMPERATURE: 98.7 F | OXYGEN SATURATION: 100 % | WEIGHT: 140 LBS | RESPIRATION RATE: 28 BRPM | DIASTOLIC BLOOD PRESSURE: 59 MMHG | SYSTOLIC BLOOD PRESSURE: 115 MMHG

## 2022-12-27 DIAGNOSIS — J40 BRONCHITIS: Primary | ICD-10-CM

## 2022-12-27 LAB
ATRIAL RATE: 67 BPM
FLUAV RNA RESP QL NAA+PROBE: NEGATIVE
FLUBV RNA RESP QL NAA+PROBE: NEGATIVE
P AXIS: 70 DEGREES
PR INTERVAL: 146 MS
QRS AXIS: 58 DEGREES
QRSD INTERVAL: 84 MS
QT INTERVAL: 384 MS
QTC INTERVAL: 405 MS
RSV RNA RESP QL NAA+PROBE: NEGATIVE
SARS-COV-2 RNA RESP QL NAA+PROBE: NEGATIVE
T WAVE AXIS: 56 DEGREES
VENTRICULAR RATE: 67 BPM

## 2022-12-27 RX ORDER — FLUTICASONE PROPIONATE 50 MCG
1 SPRAY, SUSPENSION (ML) NASAL DAILY
Status: DISCONTINUED | OUTPATIENT
Start: 2022-12-27 | End: 2022-12-27 | Stop reason: HOSPADM

## 2022-12-27 RX ORDER — ALBUTEROL SULFATE 90 UG/1
1-2 AEROSOL, METERED RESPIRATORY (INHALATION) EVERY 6 HOURS PRN
Qty: 8 G | Refills: 0 | Status: SHIPPED | OUTPATIENT
Start: 2022-12-27

## 2022-12-27 RX ORDER — AZELASTINE 1 MG/ML
1 SPRAY, METERED NASAL 2 TIMES DAILY
Qty: 1 ML | Refills: 0 | Status: SHIPPED | OUTPATIENT
Start: 2022-12-27

## 2022-12-27 RX ORDER — FLUTICASONE PROPIONATE 50 MCG
1 SPRAY, SUSPENSION (ML) NASAL DAILY
Qty: 16 G | Refills: 0 | Status: SHIPPED | OUTPATIENT
Start: 2022-12-27

## 2022-12-27 RX ORDER — ACETAMINOPHEN 325 MG/1
650 TABLET ORAL ONCE
Status: COMPLETED | OUTPATIENT
Start: 2022-12-27 | End: 2022-12-27

## 2022-12-27 RX ADMIN — FLUTICASONE PROPIONATE 1 SPRAY: 50 SPRAY, METERED NASAL at 10:40

## 2022-12-27 RX ADMIN — ACETAMINOPHEN 650 MG: 325 TABLET, FILM COATED ORAL at 10:27

## 2022-12-27 NOTE — ED ATTENDING ATTESTATION
2022  IEmmy DO, saw and evaluated the patient  I have discussed the patient with the resident/non-physician practitioner and agree with the resident's/non-physician practitioner's findings, Plan of Care, and MDM as documented in the resident's/non-physician practitioner's note, except where noted  All available labs and Radiology studies were reviewed  I was present for key portions of any procedure(s) performed by the resident/non-physician practitioner and I was immediately available to provide assistance  At this point I agree with the current assessment done in the Emergency Department  I have conducted an independent evaluation of this patient a history and physical is as follows:  HPI: Patient is a 38 y o  female who presents with 2 weeks  of cough which the patient describes at mild The patient has had contact with people with similar symptoms  The patient taken OTC medication with relief of symptoms  No Known Allergies    History reviewed  No pertinent past medical history  Past Surgical History:   Procedure Laterality Date   •  SECTION       Social History     Tobacco Use   • Smoking status: Never   • Smokeless tobacco: Never   Substance Use Topics   • Alcohol use: No   • Drug use: No       Nursing notes reviewed  Physical Exam:  ED Triage Vitals [22 0941]   Temperature Pulse Respirations Blood Pressure SpO2   (!) 96 7 °F (35 9 °C) 70 (!) 28 115/59 100 %      Temp Source Heart Rate Source Patient Position - Orthostatic VS BP Location FiO2 (%)   Tympanic -- -- -- --      Pain Score       6           ROS: Positive for cough ,the remainder of a 10 organ system ROS was otherwise unremarkable    General: awake, alert, no acute distress    Head: normocephalic, atraumatic    Eyes: no scleral icterus  Ears: external ears normal, hearing grossly intact  Nose: external exam grossly normal, negative nasal discharge  Neck: symmetric, No JVD noted, trachea midline  Pulmonary: no respiratory distress, no tachypnea noted  Cardiovascular: appears well perfused  Abdomen: no distention noted  Musculoskeletal: no deformities noted, tone normal  Neuro: grossly non-focal  Psych: mood and affect appropriate    The patient is stable and has a history and physical exam consistent with a viral illness  COVID19 testing has been performed  I considered the patient's other medical conditions as applicable/noted above in my medical decision making  The patient is stable upon discharge  The plan is for supportive care at home  The patient (and any family present) verbalized understanding of the discharge instructions and warnings that would necessitate return to the Emergency Department  All questions were answered prior to discharge  Pt re-examined and evaluated after testing and treatment  Spoke with the patient and feeling improved and sxs have resolved  Will discharge home with close f/u with pcp and instructed to return to the ED if sxs worsen or continue  Pt agrees with the plan for discharge and feels comfortable to go home with proper f/u  Advised to return for worsening or additional problems  Diagnostic tests were reviewed and questions answered  Diagnosis, care plan and treatment options were discussed  The patient understand instructions and will follow up as directed  Counseling: I had a detailed discussion with the patient and/or guardian regarding: the historical points, exam findings, and any diagnostic results supporting the discharge diagnosis, lab results, radiology results, discharge instructions reviewed with patient and/or family/caregiver and understanding was verbalized  Instructions given to return to the emergency department if symptoms worsen or persist, or if there are any questions or concerns that arise at home       All labs reviewed and utilized in the medical decision making process    All radiology studies independently viewed by me and interpreted by the radiologist       Medications   fluticasone (FLONASE) 50 mcg/act nasal spray 1 spray (1 spray Each Nare Given 12/27/22 1040)   acetaminophen (TYLENOL) tablet 650 mg (650 mg Oral Given 12/27/22 1027)     Final diagnoses:   None     ED Disposition     None      Follow-up Information    None       Patient's Medications    No medications on file     No discharge procedures on file      Electronically Signed by     ED Course         Critical Care Time  Procedures

## 2022-12-27 NOTE — ED PROVIDER NOTES
History  Chief Complaint   Patient presents with   • Cough     Pt reports cough x2 weeks with chest discomfort/tightness x2 weeks  Mild congestion  39 y/o female presents to the ED for evaluation of persistent cough x 2 weeks  She reports that the cough has been mild but has persisted for 2 weeks, productive of white/yellow sputum  He also notes nasal congestion as well as chest tightness and soreness when coughing  She does not smoke  She denies fever, chills, sore throat, chest pain, abdominal pain, nausea, vomiting, diarrhea, and urinary symptoms  None       History reviewed  No pertinent past medical history  Past Surgical History:   Procedure Laterality Date   •  SECTION         History reviewed  No pertinent family history  I have reviewed and agree with the history as documented  E-Cigarette/Vaping     E-Cigarette/Vaping Substances     Social History     Tobacco Use   • Smoking status: Never   • Smokeless tobacco: Never   Substance Use Topics   • Alcohol use: No   • Drug use: No        Review of Systems   Constitutional: Negative for chills and fever  HENT: Positive for congestion  Negative for sore throat  Respiratory: Positive for cough and chest tightness  Negative for shortness of breath  Cardiovascular: Negative for chest pain and palpitations  Gastrointestinal: Negative for abdominal pain, diarrhea, nausea and vomiting  Genitourinary: Negative for dysuria and hematuria  Musculoskeletal: Negative for back pain and neck pain  Neurological: Negative for dizziness, weakness, light-headedness, numbness and headaches  All other systems reviewed and are negative        Physical Exam  ED Triage Vitals [22 0941]   Temperature Pulse Respirations Blood Pressure SpO2   (!) 96 7 °F (35 9 °C) 70 (!) 28 115/59 100 %      Temp Source Heart Rate Source Patient Position - Orthostatic VS BP Location FiO2 (%)   Tympanic -- -- -- --      Pain Score       6 Orthostatic Vital Signs  Vitals:    12/27/22 0941   BP: 115/59   Pulse: 70       Physical Exam  Vitals and nursing note reviewed  Constitutional:       General: She is not in acute distress  Appearance: Normal appearance  She is normal weight  She is not ill-appearing  HENT:      Head: Normocephalic and atraumatic  Right Ear: External ear normal       Left Ear: External ear normal       Nose: Congestion present  Mouth/Throat:      Mouth: Mucous membranes are moist       Pharynx: Oropharynx is clear  No oropharyngeal exudate or posterior oropharyngeal erythema  Eyes:      Extraocular Movements: Extraocular movements intact  Conjunctiva/sclera: Conjunctivae normal       Pupils: Pupils are equal, round, and reactive to light  Cardiovascular:      Rate and Rhythm: Normal rate and regular rhythm  Pulses: Normal pulses  Heart sounds: Normal heart sounds  No murmur heard  Pulmonary:      Effort: Pulmonary effort is normal  No respiratory distress  Breath sounds: Normal breath sounds  No wheezing or rales  Comments: Occasional cough  Chest:      Chest wall: No tenderness  Abdominal:      General: Abdomen is flat  Bowel sounds are normal  There is no distension  Palpations: Abdomen is soft  Tenderness: There is no abdominal tenderness  There is no right CVA tenderness, left CVA tenderness or guarding  Musculoskeletal:         General: No swelling or tenderness  Normal range of motion  Cervical back: Normal range of motion and neck supple  No tenderness  Skin:     General: Skin is warm and dry  Capillary Refill: Capillary refill takes less than 2 seconds  Neurological:      General: No focal deficit present  Mental Status: She is alert and oriented to person, place, and time           ED Medications  Medications   acetaminophen (TYLENOL) tablet 650 mg (650 mg Oral Given 12/27/22 1027)       Diagnostic Studies  Results Reviewed Procedure Component Value Units Date/Time    FLU/RSV/COVID - if FLU/RSV clinically relevant [[id number]]  (Normal) Collected: 12/27/22 1023    Lab Status: Final result Specimen: Nares from Nose Updated: 12/27/22 1111     SARS-CoV-2 Negative     INFLUENZA A PCR Negative     INFLUENZA B PCR Negative     RSV PCR Negative    Narrative:      FOR PEDIATRIC PATIENTS - copy/paste COVID Guidelines URL to browser: [email or web address]  LocalOn    SARS-CoV-2 assay is a Nucleic Acid Amplification assay intended for the  qualitative detection of nucleic acid from SARS-CoV-2 in nasopharyngeal  swabs  Results are for the presumptive identification of SARS-CoV-2 RNA  Positive results are indicative of infection with SARS-CoV-2, the virus  causing COVID-19, but do not rule out bacterial infection or co-infection  with other viruses  Laboratories within the [place] and its  territories are required to report all positive results to the appropriate  public health authorities  Negative results do not preclude SARS-CoV-2  infection and should not be used as the sole basis for treatment or other  patient management decisions  Negative results must be combined with  clinical observations, patient history, and epidemiological information  This test has not been FDA cleared or approved  This test has been authorized by FDA under an Emergency Use Authorization  (EUA)  This test is only authorized for the duration of time the  declaration that circumstances exist justifying the authorization of the  emergency use of an in vitro diagnostic tests for detection of SARS-CoV-2  virus and/or diagnosis of COVID-19 infection under section 564(b)(1) of  the Act, 21 U  S C  573FTN-4(Z)(4), unless the authorization is terminated  or revoked sooner  The test has been validated but independent review by FDA  and CLIA is pending  Test performed using [employer] GeneXpert:  This RT-PCR assay targets N2,  a region unique to SARS-CoV-2  A conserved region in the E-gene was chosen  for pan-Sarbecovirus detection which includes SARS-CoV-2  According to CMS-2020-01-R, this platform meets the definition of high-throughput technology  XR chest 2 views   Final Result by Alex Bar MD (12/27 1205)      No acute cardiopulmonary disease  Workstation performed: [id number]               Procedures  Procedures      ED Course                                       MDM  Number of Diagnoses or Management Options  Bronchitis  Diagnosis management comments: This is a 39 y/o female presenting for evaluation of persistent cough x 2 weeks  She reports that the cough has been mild but has persisted for 2 weeks, productive of white/yellow sputum  He also notes nasal congestion as well as chest tightness and soreness when coughing  She does not smoke  She denies fever, chills, sore throat, chest pain, abdominal pain, nausea, vomiting, diarrhea, and urinary symptoms  Lungs are clear to auscultation on exam  Slight congestion noted  Occasional cough  Suspect viral URI and possible bronchitis  Viral testing obtained, negative for COVID/influenza/RSV  Chest x-ray ordered, no acute infiltrate on my interpretation  I discussed symptomatic management with the patient  Discussed all findings, treatment, red flags/return precautions, and outpatient follow-up and the patient/family understands and agrees  Stable for discharge  Disposition  Final diagnoses:   Bronchitis     Time reflects when diagnosis was documented in both MDM as applicable and the Disposition within this note     Time User Action Codes Description Comment    12/27/2022 11:53 AM Tomasz Parekh Add [J40] Bronchitis       ED Disposition     ED Disposition   Discharge    Condition   Stable    Date/Time   Tue Dec 27, 2022 11:53 AM    Mariela Chavez November discharge to home/self care                 Follow-up Information Follow up With Specialties Details Why Contact Info Additional Information    Adela Alberto MD Family Medicine Call in 1 week For follow up Kiarra Marquez  [phone]  [phone]       [address] [place] [address] Emergency Department Emergency Medicine Go to  If symptoms worsen [address] [phone]  [facility] Emergency Department, [address] [place] [address], [address]          Discharge Medication List as of 12/27/2022 11:55 AM      START taking these medications    Details   albuterol (Ventolin HFA) 90 mcg/act inhaler Inhale 1-2 puffs every 6 (six) hours as needed for shortness of breath (Coughing), Starting Tue 12/27/2022, Normal      azelastine (ASTELIN) 0 1 % nasal spray 1 spray into each nostril 2 (two) times a day Use in each nostril as directed, Starting Tue 12/27/2022, Normal      fluticasone (FLONASE) 50 mcg/act nasal spray 1 spray into each nostril daily, Starting Tue 12/27/2022, Normal           No discharge procedures on file  PDMP Review     None           ED Provider  Attending physically available and evaluated Karine James I managed the patient along with the ED Attending      Electronically Signed by         Matt Tripp MD  12/31/22 [id number]

## 2022-12-27 NOTE — Clinical Note
Dona Mclaughlin was seen and treated in our emergency department on 12/27/2022  N/A    Diagnosis: Bronchitis    Karine  is off the rest of the shift today, may return to work on return date  She may return on this date: 12/30/2022         If you have any questions or concerns, please don't hesitate to call        Crista Campo MD    ______________________________           _______________          _______________  [employer] Representative                              Date                                Time

## 2023-10-11 ENCOUNTER — OFFICE VISIT (OUTPATIENT)
Dept: FAMILY MEDICINE CLINIC | Facility: CLINIC | Age: 43
End: 2023-10-11
Payer: COMMERCIAL

## 2023-10-11 VITALS
SYSTOLIC BLOOD PRESSURE: 110 MMHG | RESPIRATION RATE: 17 BRPM | TEMPERATURE: 97.8 F | WEIGHT: 140.8 LBS | HEART RATE: 89 BPM | BODY MASS INDEX: 25.91 KG/M2 | HEIGHT: 62 IN | OXYGEN SATURATION: 100 % | DIASTOLIC BLOOD PRESSURE: 60 MMHG

## 2023-10-11 DIAGNOSIS — Z12.31 ENCOUNTER FOR SCREENING MAMMOGRAM FOR BREAST CANCER: ICD-10-CM

## 2023-10-11 DIAGNOSIS — Z01.89 ENCOUNTER FOR ROUTINE LABORATORY TESTING: ICD-10-CM

## 2023-10-11 DIAGNOSIS — R10.13 EPIGASTRIC ABDOMINAL PAIN: Primary | ICD-10-CM

## 2023-10-11 DIAGNOSIS — Z11.4 SCREENING FOR HIV (HUMAN IMMUNODEFICIENCY VIRUS): ICD-10-CM

## 2023-10-11 DIAGNOSIS — Z12.4 SCREENING FOR CERVICAL CANCER: ICD-10-CM

## 2023-10-11 DIAGNOSIS — Z11.59 NEED FOR HEPATITIS C SCREENING TEST: ICD-10-CM

## 2023-10-11 PROBLEM — K29.50 CHRONIC GASTRITIS WITHOUT BLEEDING: Status: ACTIVE | Noted: 2023-10-11

## 2023-10-11 PROCEDURE — 99214 OFFICE O/P EST MOD 30 MIN: CPT | Performed by: FAMILY MEDICINE

## 2023-10-11 RX ORDER — OMEPRAZOLE 40 MG/1
40 CAPSULE, DELAYED RELEASE ORAL
Qty: 30 CAPSULE | Refills: 5 | Status: SHIPPED | OUTPATIENT
Start: 2023-10-11 | End: 2024-04-08

## 2023-10-11 RX ORDER — SUCRALFATE ORAL 1 G/10ML
1 SUSPENSION ORAL
Qty: 1200 ML | Refills: 0 | Status: SHIPPED | OUTPATIENT
Start: 2023-10-11 | End: 2023-11-10

## 2023-10-11 NOTE — PROGRESS NOTES
Name: Meghan Thacker      : 1980      MRN: [id number]  Encounter Provider: Luly Sanchez MD  Encounter Date: 10/11/2023   Encounter department: [facility]     1. Epigastric abdominal pain  Assessment & Plan:  Suspect gastritis  To check for H. Pylori   Trial of PPI and sucrafate   Referral to GI  To get ultrasound to r/o gallstones     Orders:  -     Celiac Disease Antibody Profile  -     H. pylori antigen, stool  -     omeprazole (PriLOSEC) 40 MG capsule; Take 1 capsule (40 mg total) by mouth daily before breakfast  -     sucralfate (CARAFATE) 1 g/10 mL suspension; Take 10 mL (1 g total) by mouth 4 (four) times a day (with meals and at bedtime)  -     US abdomen complete; Future; Expected date: 10/11/2023    2. Need for hepatitis C screening test  -     Hepatitis C antibody; Future    3. Screening for HIV (human immunodeficiency virus)  -     HIV 1/2 AG/AB w Reflex [facility] for 2 yr old and above; Future    4. Screening for cervical cancer    5. Encounter for screening mammogram for breast cancer  -     Mammo screening bilateral w 3d & cad; Future; Expected date: 10/11/2023    6. Encounter for routine laboratory testing  -     CBC and differential  -     Comprehensive metabolic panel  -     Lipid panel      BMI Counseling: Body mass index is 25.75 kg/m². The BMI is above normal. Nutrition recommendations include decreasing portion sizes and encouraging healthy choices of fruits and vegetables. Exercise recommendations include moderate physical activity 150 minutes/week. No pharmacotherapy was ordered. Rationale for BMI follow-up plan is due to patient being overweight or obese. Depression Screening and Follow-up Plan: Patient was screened for depression during today's encounter. They screened negative with a PHQ-2 score of 0.         Subjective    Epigastric pain on and off all her life and worsening for the last 2 days   Ate pizza prior to onset of symptoms Eating makes it worse     Abdominal Pain  This is a chronic problem. The current episode started more than 1 year ago (most of her life). The problem has been waxing and waning. The pain is located in the epigastric region. The pain is mild. The quality of the pain is sharp. The abdominal pain does not radiate. Associated symptoms include nausea. Pertinent negatives include no anorexia, arthralgias, belching, constipation, diarrhea, dysuria, fever, flatus, frequency, headaches, hematochezia, hematuria, melena, myalgias, vomiting or weight loss. The pain is aggravated by eating. She has tried nothing for the symptoms. There is no history of abdominal surgery, colon cancer, Crohn's disease, gallstones, irritable bowel syndrome, pancreatitis, PUD or ulcerative colitis. Review of Systems   Constitutional:  Negative for fever and weight loss. Gastrointestinal:  Positive for abdominal pain and nausea. Negative for anorexia, constipation, diarrhea, flatus, hematochezia, melena and vomiting. Genitourinary:  Negative for dysuria, frequency and hematuria. Musculoskeletal:  Negative for arthralgias and myalgias. Neurological:  Negative for headaches.        Current Outpatient Medications on File Prior to Visit   Medication Sig   • [DISCONTINUED] albuterol (Ventolin HFA) 90 mcg/act inhaler Inhale 1-2 puffs every 6 (six) hours as needed for shortness of breath (Coughing) (Patient not taking: Reported on 10/11/2023)   • [DISCONTINUED] azelastine (ASTELIN) 0.1 % nasal spray 1 spray into each nostril 2 (two) times a day Use in each nostril as directed (Patient not taking: Reported on 10/11/2023)   • [DISCONTINUED] fluticasone (FLONASE) 50 mcg/act nasal spray 1 spray into each nostril daily (Patient not taking: Reported on 10/11/2023)       Objective     /60 (BP Location: Left arm, Patient Position: Sitting, Cuff Size: Standard)   Pulse 89   Temp 97.8 °F (36.6 °C) (Tympanic)   Resp 17   Ht 5' 2" (1.575 m) Wt 63.9 kg (140 lb 12.8 oz)   SpO2 100%   BMI 25.75 kg/m²     Physical Exam  Constitutional:       Appearance: She is well-developed. Cardiovascular:      Rate and Rhythm: Normal rate and regular rhythm. Heart sounds: Normal heart sounds. Pulmonary:      Effort: Pulmonary effort is normal.      Breath sounds: Normal breath sounds. Abdominal:      General: Bowel sounds are normal. There is no distension. Palpations: There is no fluid wave or hepatomegaly. Tenderness: There is abdominal tenderness in the epigastric area. There is left CVA tenderness. There is no right CVA tenderness. Neurological:      Mental Status: She is alert.        Justin Henderson MD

## 2023-10-11 NOTE — ASSESSMENT & PLAN NOTE
Suspect gastritis  To check for H. Pylori   Trial of PPI and sucrafate   Referral to GI  To get ultrasound to r/o gallstones

## 2023-10-20 ENCOUNTER — HOSPITAL ENCOUNTER (OUTPATIENT)
Dept: ULTRASOUND IMAGING | Facility: HOSPITAL | Age: 43
Discharge: HOME | End: 2023-10-20
Payer: COMMERCIAL

## 2023-10-20 ENCOUNTER — APPOINTMENT (OUTPATIENT)
Dept: LAB | Facility: CLINIC | Age: 43
End: 2023-10-20
Payer: COMMERCIAL

## 2023-10-20 DIAGNOSIS — R10.13 EPIGASTRIC ABDOMINAL PAIN: ICD-10-CM

## 2023-10-20 DIAGNOSIS — Z11.4 SCREENING FOR HIV (HUMAN IMMUNODEFICIENCY VIRUS): ICD-10-CM

## 2023-10-20 DIAGNOSIS — Z11.59 NEED FOR HEPATITIS C SCREENING TEST: ICD-10-CM

## 2023-10-20 LAB
ALBUMIN SERPL BCP-MCNC: 3.8 G/DL (ref 3.5–5)
ALP SERPL-CCNC: 49 U/L (ref 34–104)
ALT SERPL W P-5'-P-CCNC: 11 U/L (ref 7–52)
ANION GAP SERPL CALCULATED.3IONS-SCNC: 4 MMOL/L
AST SERPL W P-5'-P-CCNC: 13 U/L (ref 13–39)
BASOPHILS # BLD AUTO: 0.07 THOUSANDS/ÂΜL (ref 0–0.1)
BASOPHILS NFR BLD AUTO: 1 % (ref 0–1)
BILIRUB SERPL-MCNC: 0.38 MG/DL (ref 0.2–1)
BUN SERPL-MCNC: 12 MG/DL (ref 5–25)
CALCIUM SERPL-MCNC: 8.8 MG/DL (ref 8.4–10.2)
CHLORIDE SERPL-SCNC: 109 MMOL/L (ref 96–108)
CHOLEST SERPL-MCNC: 152 MG/DL
CO2 SERPL-SCNC: 25 MMOL/L (ref 21–32)
CREAT SERPL-MCNC: 0.62 MG/DL (ref 0.6–1.3)
EOSINOPHIL # BLD AUTO: 0.18 THOUSAND/ÂΜL (ref 0–0.61)
EOSINOPHIL NFR BLD AUTO: 3 % (ref 0–6)
ERYTHROCYTE [DISTWIDTH] IN BLOOD BY AUTOMATED COUNT: 18.2 % (ref 11.6–15.1)
GFR SERPL CREATININE-BSD FRML MDRD: 112 ML/MIN/1.73SQ M
GLUCOSE P FAST SERPL-MCNC: 84 MG/DL (ref 65–99)
HCT VFR BLD AUTO: 33.3 % (ref 34.8–46.1)
HCV AB SER QL: NORMAL
HDLC SERPL-MCNC: 50 MG/DL
HGB BLD-MCNC: 9.7 G/DL (ref 11.5–15.4)
HIV 1+2 AB+HIV1 P24 AG SERPL QL IA: NORMAL
HIV 2 AB SERPL QL IA: NORMAL
HIV1 AB SERPL QL IA: NORMAL
HIV1 P24 AG SERPL QL IA: NORMAL
IMM GRANULOCYTES # BLD AUTO: 0.04 THOUSAND/UL (ref 0–0.2)
IMM GRANULOCYTES NFR BLD AUTO: 1 % (ref 0–2)
LDLC SERPL CALC-MCNC: 87 MG/DL (ref 0–100)
LYMPHOCYTES # BLD AUTO: 2 THOUSANDS/ÂΜL (ref 0.6–4.47)
LYMPHOCYTES NFR BLD AUTO: 30 % (ref 14–44)
MCH RBC QN AUTO: 21.7 PG (ref 26.8–34.3)
MCHC RBC AUTO-ENTMCNC: 29.1 G/DL (ref 31.4–37.4)
MCV RBC AUTO: 75 FL (ref 82–98)
MONOCYTES # BLD AUTO: 0.5 THOUSAND/ÂΜL (ref 0.17–1.22)
MONOCYTES NFR BLD AUTO: 8 % (ref 4–12)
NEUTROPHILS # BLD AUTO: 3.87 THOUSANDS/ÂΜL (ref 1.85–7.62)
NEUTS SEG NFR BLD AUTO: 57 % (ref 43–75)
NONHDLC SERPL-MCNC: 102 MG/DL
NRBC BLD AUTO-RTO: 0 /100 WBCS
PLATELET # BLD AUTO: 296 THOUSANDS/UL (ref 149–390)
PMV BLD AUTO: 12.3 FL (ref 8.9–12.7)
POTASSIUM SERPL-SCNC: 4.3 MMOL/L (ref 3.5–5.3)
PROT SERPL-MCNC: 7.1 G/DL (ref 6.4–8.4)
RBC # BLD AUTO: 4.46 MILLION/UL (ref 3.81–5.12)
SODIUM SERPL-SCNC: 138 MMOL/L (ref 135–147)
TRIGL SERPL-MCNC: 75 MG/DL
WBC # BLD AUTO: 6.66 THOUSAND/UL (ref 4.31–10.16)

## 2023-10-20 PROCEDURE — 76700 US EXAM ABDOM COMPLETE: CPT

## 2023-10-20 PROCEDURE — 85025 COMPLETE CBC W/AUTO DIFF WBC: CPT | Performed by: FAMILY MEDICINE

## 2023-10-20 PROCEDURE — 86803 HEPATITIS C AB TEST: CPT

## 2023-10-20 PROCEDURE — 80053 COMPREHEN METABOLIC PANEL: CPT | Performed by: FAMILY MEDICINE

## 2023-10-20 PROCEDURE — 87389 HIV-1 AG W/HIV-1&-2 AB AG IA: CPT

## 2023-10-20 PROCEDURE — 86364 TISS TRNSGLTMNASE EA IG CLAS: CPT | Performed by: FAMILY MEDICINE

## 2023-10-20 PROCEDURE — 86231 EMA EACH IG CLASS: CPT | Performed by: FAMILY MEDICINE

## 2023-10-20 PROCEDURE — 36415 COLL VENOUS BLD VENIPUNCTURE: CPT | Performed by: FAMILY MEDICINE

## 2023-10-20 PROCEDURE — 86258 DGP ANTIBODY EACH IG CLASS: CPT | Performed by: FAMILY MEDICINE

## 2023-10-20 PROCEDURE — 80061 LIPID PANEL: CPT | Performed by: FAMILY MEDICINE

## 2023-10-20 PROCEDURE — 82784 ASSAY IGA/IGD/IGG/IGM EACH: CPT | Performed by: FAMILY MEDICINE

## 2023-10-22 LAB
ENDOMYSIUM IGA SER QL: NEGATIVE
GLIADIN PEPTIDE IGA SER-ACNC: 10 UNITS (ref 0–19)
GLIADIN PEPTIDE IGG SER-ACNC: 6 UNITS (ref 0–19)
IGA SERPL-MCNC: 287 MG/DL (ref 87–352)
TTG IGA SER-ACNC: <2 U/ML (ref 0–3)
TTG IGG SER-ACNC: 7 U/ML (ref 0–5)

## 2023-10-23 DIAGNOSIS — R76.8 POSITIVE AUTOANTIBODY SCREENING FOR CELIAC DISEASE: ICD-10-CM

## 2023-10-23 DIAGNOSIS — D50.8 OTHER IRON DEFICIENCY ANEMIA: Primary | ICD-10-CM

## 2023-10-23 RX ORDER — FERROUS SULFATE 324(65)MG
324 TABLET, DELAYED RELEASE (ENTERIC COATED) ORAL
Qty: 60 TABLET | Refills: 0 | Status: SHIPPED | OUTPATIENT
Start: 2023-10-23 | End: 2023-11-30 | Stop reason: SDUPTHER

## 2023-10-25 ENCOUNTER — CONSULT (OUTPATIENT)
Dept: GASTROENTEROLOGY | Facility: CLINIC | Age: 43
End: 2023-10-25

## 2023-10-25 ENCOUNTER — TELEPHONE (OUTPATIENT)
Dept: GASTROENTEROLOGY | Facility: CLINIC | Age: 43
End: 2023-10-25

## 2023-10-25 VITALS
BODY MASS INDEX: 25.95 KG/M2 | SYSTOLIC BLOOD PRESSURE: 104 MMHG | WEIGHT: 141 LBS | HEIGHT: 62 IN | TEMPERATURE: 98.6 F | DIASTOLIC BLOOD PRESSURE: 62 MMHG

## 2023-10-25 DIAGNOSIS — R76.8 POSITIVE AUTOANTIBODY SCREENING FOR CELIAC DISEASE: ICD-10-CM

## 2023-10-25 DIAGNOSIS — D50.8 OTHER IRON DEFICIENCY ANEMIA: ICD-10-CM

## 2023-10-25 RX ORDER — POLYETHYLENE GLYCOL 3350, SODIUM SULFATE ANHYDROUS, SODIUM BICARBONATE, SODIUM CHLORIDE, POTASSIUM CHLORIDE 236; 22.74; 6.74; 5.86; 2.97 G/4L; G/4L; G/4L; G/4L; G/4L
4000 POWDER, FOR SOLUTION ORAL ONCE
Qty: 4000 ML | Refills: 0 | Status: SHIPPED | OUTPATIENT
Start: 2023-10-25 | End: 2023-10-25

## 2023-10-25 NOTE — PROGRESS NOTES
[facility] - Outpatient Consultation  Karine Parrish Done 41 y.o. female MRN: [id number]  Encounter: [id number]          ASSESSMENT AND PLAN:    Radames Sims is a 41 y.o. female with PMH of abdominal pain, PEDRO presenting for evaluation. Abdominal Pain  - No known iron deficiency, but with chronic microcytic anemia suspicious for PEDRO   - Will check iron panel  - Celiac panel with very weakly positive anti-tissue transglutaminase IgG, negative all other assays. In this setting, low suspicion for celiac disease but is on the differential  - Will check EGD with gastric and duodenal biopsies  - Will plan for colonoscopy at the same time given probable PEDRO     1. Other iron deficiency anemia    2. Positive autoantibody screening for celiac disease        Orders Placed This Encounter   Procedures    EGD    Colonoscopy     ______________________________________________________________________    HPI:    Radames Sims is a 41 y.o. female with PMH of abdominal pain, PEDRO presenting for evaluation. Pt has had abdominal pain, predominantly epigastric, since she was a teenager. Has not significantly worsened in that time. Usually goes away with drinking hot water, however lately it has not helped as much. Saw her PCP for these sx, was prescribed omeprazole which she is taking correctly (30 min prior to breakfast) and has helped somewhat. PCP also checked labs with celiac panel showing weakly positive ant-tissue translgutaminase IgG but negative anti-TTG IgA with normal total IgA levels. Hg low, 9.7 from prior value of 10.4 in 2018. No iron panel checked, but MCV is microcytic at 75. Normal menstrual cycles per pt. Is endorsing chronic dizziness as well, not worse than baseline currently. Normotensive on today's vitals. Normal BM caliber, no FH of CRC, no blood in stools or melena reported. Has never had an EGD or colonoscopy.      REVIEW OF SYSTEMS:    CONSTITUTIONAL: Denies any fever, chills, rigors, and weight loss. HEENT: No earache or tinnitus. Denies hearing loss or visual disturbances. CARDIOVASCULAR: No chest pain or palpitations. RESPIRATORY: Denies any cough, hemoptysis, shortness of breath or dyspnea on exertion. GASTROINTESTINAL: As noted in the History of Present Illness. GENITOURINARY: No problems with urination. Denies any hematuria or dysuria. NEUROLOGIC: No dizziness or vertigo, denies headaches. MUSCULOSKELETAL: Denies any muscle or joint pain. SKIN: Denies skin rashes or itching. ENDOCRINE: Denies excessive thirst. Denies intolerance to heat or cold. PSYCHOSOCIAL: Denies depression or anxiety. Denies any recent memory loss. Historical Information   History reviewed. No pertinent past medical history. Past Surgical History:   Procedure Laterality Date     SECTION       Social History   Social History     Substance and Sexual Activity   Alcohol Use No     Social History     Substance and Sexual Activity   Drug Use No     Social History     Tobacco Use   Smoking Status Never   Smokeless Tobacco Never     History reviewed. No pertinent family history. Meds/Allergies       Current Outpatient Medications:     ferrous sulfate 324 (65 Fe) mg    omeprazole (PriLOSEC) 40 MG capsule    sucralfate (CARAFATE) 1 g/10 mL suspension    No Known Allergies        Objective     Blood pressure 104/62, temperature 98.6 °F (37 °C), temperature source Tympanic, height 5' 2" (1.575 m), weight 64 kg (141 lb). Body mass index is 25.79 kg/m². Wt Readings from Last 3 Encounters:   10/25/23 64 kg (141 lb)   10/11/23 63.9 kg (140 lb 12.8 oz)   22 63.5 kg (140 lb)        PHYSICAL EXAM:      General Appearance:   Alert, cooperative, no distress   HEENT:   Normocephalic, atraumatic, anicteric.      Neck:  Supple, symmetrical, trachea midline   Lungs:   Clear to auscultation bilaterally; no rales, rhonchi or wheezing; respirations unlabored    Heart[de-identified] Regular rate and rhythm; no murmur, rub, or gallop. Abdomen:   Soft, non-tender, non-distended; normal bowel sounds; no masses, no organomegaly    Genitalia:   Deferred    Rectal:   Deferred    Extremities:  No cyanosis, clubbing or edema    Pulses:  2+ and symmetric    Skin:  No jaundice, rashes, or lesions    Lymph nodes:  No palpable cervical lymphadenopathy        Lab Results:         Lab Units 10/20/23  1151   SODIUM mmol/L 138   POTASSIUM mmol/L 4.3   CHLORIDE mmol/L 109*   CO2 mmol/L 25   BUN mg/dL 12   CREATININE mg/dL 0.62   CALCIUM mg/dL 8.8            Lab Units 10/20/23  1151   TOTAL PROTEIN g/dL 7.1   ALBUMIN g/dL 3.8   TOTAL BILIRUBIN mg/dL 0.38   AST U/L 13   ALT U/L 11   ALK PHOS U/L 49           Lab Units 10/20/23  1151   WBC Thousand/uL 6.66   HEMOGLOBIN g/dL 9.7*   HEMATOCRIT % 33.3*   PLATELETS Thousands/uL 296   MCV fL 75*   MCH pg 21.7*   MCHC g/dL 29.1*   RDW % 18.2*   MPV fL 12.3   NRBC AUTO /100 WBCs 0   NEUTROS PCT % 57   IMMATURE GRANULOCYTES % % 1   LYMPHS PCT % 30   MONOS PCT % 8   EOS PCT % 3   BASOS PCT % 1   NEUTROS ABS Thousands/µL 3.87   IMMATURE GRANULOCYES ABS Thousand/uL 0.04   LYMPHS ABS Thousands/µL 2.00   MONOS ABS Thousand/µL 0.50   EOS ABS Thousand/µL 0.18   BASOS ABS Thousands/µL 0.07       No results for input(s): "IRON", "TRANSFERRIN", "TRANSFERSAT", "FERRITIN", "RETIC" in the last 60016 hours. No results found for: "CRP"    Lab Results   Component Value Date    TMB7SLPVRKTB 0.564 08/19/2016       Radiology Results:   No results found.     Rj Jacobo MD  PGY-5 Gastroenterology Fellow  10/25/2023 2:46 PM

## 2023-10-25 NOTE — PATIENT INSTRUCTIONS
Scheduled date of EGD/colonoscopy (as of today):11/01/2023  Physician performing EGD/colonoscopy:Liane  Location of EGD/colonoscopy:  [address] [place] [address]  Desired bowel prep reviewed with patient: golytely / Dulcolax  Instructions reviewed with patient by: Dallas Beltran  Clearances:  NONE  Patient was also scheduled for follow up  on 03/07/2024

## 2023-10-26 ENCOUNTER — ANESTHESIA EVENT (OUTPATIENT)
Dept: ANESTHESIOLOGY | Facility: HOSPITAL | Age: 43
End: 2023-10-26

## 2023-10-26 ENCOUNTER — ANESTHESIA (OUTPATIENT)
Dept: ANESTHESIOLOGY | Facility: HOSPITAL | Age: 43
End: 2023-10-26

## 2023-10-26 ENCOUNTER — APPOINTMENT (OUTPATIENT)
Dept: LAB | Facility: CLINIC | Age: 43
End: 2023-10-26
Payer: COMMERCIAL

## 2023-10-27 DIAGNOSIS — A04.8 H. PYLORI INFECTION: Primary | ICD-10-CM

## 2023-10-27 LAB — H PYLORI AG STL QL IA: POSITIVE

## 2023-10-27 RX ORDER — AMOXICILLIN 500 MG/1
1000 CAPSULE ORAL EVERY 12 HOURS SCHEDULED
Qty: 40 CAPSULE | Refills: 0 | Status: SHIPPED | OUTPATIENT
Start: 2023-10-27 | End: 2023-11-06

## 2023-10-27 RX ORDER — CLARITHROMYCIN 500 MG/1
500 TABLET, COATED ORAL EVERY 12 HOURS SCHEDULED
Qty: 20 TABLET | Refills: 0 | Status: SHIPPED | OUTPATIENT
Start: 2023-10-27 | End: 2023-11-06

## 2023-10-30 ENCOUNTER — TELEPHONE (OUTPATIENT)
Age: 43
End: 2023-10-30

## 2023-10-30 NOTE — TELEPHONE ENCOUNTER
Pt called to rs procedure due to pcp advised her to. Pt was put on an anitbiotic and her fu appt w/ her doctor is in the end of November. Pt requested an appt for after her ov w/ her doctor. Pt rescheduled 12/12/23 w/ Dr. Joanne Sinha at [facility].

## 2023-11-28 ENCOUNTER — ANESTHESIA (OUTPATIENT)
Dept: ANESTHESIOLOGY | Facility: HOSPITAL | Age: 43
End: 2023-11-28

## 2023-11-28 ENCOUNTER — ANESTHESIA EVENT (OUTPATIENT)
Dept: ANESTHESIOLOGY | Facility: HOSPITAL | Age: 43
End: 2023-11-28

## 2023-11-29 ENCOUNTER — APPOINTMENT (OUTPATIENT)
Dept: LAB | Facility: CLINIC | Age: 43
End: 2023-11-29
Payer: COMMERCIAL

## 2023-11-29 ENCOUNTER — OFFICE VISIT (OUTPATIENT)
Dept: FAMILY MEDICINE CLINIC | Facility: CLINIC | Age: 43
End: 2023-11-29
Payer: COMMERCIAL

## 2023-11-29 VITALS
HEIGHT: 63 IN | SYSTOLIC BLOOD PRESSURE: 98 MMHG | HEART RATE: 54 BPM | BODY MASS INDEX: 25.45 KG/M2 | TEMPERATURE: 98.5 F | DIASTOLIC BLOOD PRESSURE: 60 MMHG | OXYGEN SATURATION: 100 % | WEIGHT: 143.6 LBS | RESPIRATION RATE: 18 BRPM

## 2023-11-29 DIAGNOSIS — D50.8 OTHER IRON DEFICIENCY ANEMIA: ICD-10-CM

## 2023-11-29 DIAGNOSIS — R10.13 EPIGASTRIC ABDOMINAL PAIN: ICD-10-CM

## 2023-11-29 DIAGNOSIS — A04.8 H. PYLORI INFECTION: ICD-10-CM

## 2023-11-29 DIAGNOSIS — R89.4 ABNORMAL CELIAC ANTIBODY PANEL: ICD-10-CM

## 2023-11-29 DIAGNOSIS — Z00.00 ANNUAL PHYSICAL EXAM: Primary | ICD-10-CM

## 2023-11-29 PROBLEM — D50.9 IRON DEFICIENCY ANEMIA: Status: ACTIVE | Noted: 2023-11-29

## 2023-11-29 LAB
BASOPHILS # BLD AUTO: 0.04 THOUSANDS/ÂΜL (ref 0–0.1)
BASOPHILS NFR BLD AUTO: 1 % (ref 0–1)
EOSINOPHIL # BLD AUTO: 0.23 THOUSAND/ÂΜL (ref 0–0.61)
EOSINOPHIL NFR BLD AUTO: 3 % (ref 0–6)
ERYTHROCYTE [DISTWIDTH] IN BLOOD BY AUTOMATED COUNT: 18.1 % (ref 11.6–15.1)
FERRITIN SERPL-MCNC: 3 NG/ML (ref 11–307)
HCT VFR BLD AUTO: 33.2 % (ref 34.8–46.1)
HGB BLD-MCNC: 9.5 G/DL (ref 11.5–15.4)
IMM GRANULOCYTES # BLD AUTO: 0.01 THOUSAND/UL (ref 0–0.2)
IMM GRANULOCYTES NFR BLD AUTO: 0 % (ref 0–2)
IRON SATN MFR SERPL: 4 % (ref 15–50)
IRON SERPL-MCNC: 17 UG/DL (ref 50–212)
LYMPHOCYTES # BLD AUTO: 2.59 THOUSANDS/ÂΜL (ref 0.6–4.47)
LYMPHOCYTES NFR BLD AUTO: 37 % (ref 14–44)
MCH RBC QN AUTO: 21.2 PG (ref 26.8–34.3)
MCHC RBC AUTO-ENTMCNC: 28.6 G/DL (ref 31.4–37.4)
MCV RBC AUTO: 74 FL (ref 82–98)
MONOCYTES # BLD AUTO: 0.63 THOUSAND/ÂΜL (ref 0.17–1.22)
MONOCYTES NFR BLD AUTO: 9 % (ref 4–12)
NEUTROPHILS # BLD AUTO: 3.54 THOUSANDS/ÂΜL (ref 1.85–7.62)
NEUTS SEG NFR BLD AUTO: 50 % (ref 43–75)
NRBC BLD AUTO-RTO: 0 /100 WBCS
PLATELET # BLD AUTO: 295 THOUSANDS/UL (ref 149–390)
PMV BLD AUTO: 11.6 FL (ref 8.9–12.7)
RBC # BLD AUTO: 4.48 MILLION/UL (ref 3.81–5.12)
TIBC SERPL-MCNC: 456 UG/DL (ref 250–450)
UIBC SERPL-MCNC: 439 UG/DL (ref 155–355)
WBC # BLD AUTO: 7.04 THOUSAND/UL (ref 4.31–10.16)

## 2023-11-29 PROCEDURE — 85025 COMPLETE CBC W/AUTO DIFF WBC: CPT | Performed by: FAMILY MEDICINE

## 2023-11-29 PROCEDURE — 82728 ASSAY OF FERRITIN: CPT

## 2023-11-29 PROCEDURE — 83540 ASSAY OF IRON: CPT

## 2023-11-29 PROCEDURE — 36415 COLL VENOUS BLD VENIPUNCTURE: CPT

## 2023-11-29 PROCEDURE — 99214 OFFICE O/P EST MOD 30 MIN: CPT | Performed by: FAMILY MEDICINE

## 2023-11-29 PROCEDURE — 99396 PREV VISIT EST AGE 40-64: CPT | Performed by: FAMILY MEDICINE

## 2023-11-29 PROCEDURE — 83550 IRON BINDING TEST: CPT

## 2023-11-29 RX ORDER — OMEPRAZOLE 40 MG/1
40 CAPSULE, DELAYED RELEASE ORAL
Qty: 30 CAPSULE | Refills: 5 | Status: SHIPPED | OUTPATIENT
Start: 2023-11-29 | End: 2024-05-27

## 2023-11-29 NOTE — PROGRESS NOTES
[facility]    NAME: Raven Chan  AGE: 41 y.o. SEX: female  : 1980     DATE: 2023     Assessment and Plan:     Problem List Items Addressed This Visit        Other    H. pylori infection     Positive test H pylori on 10/26/2023  Treated with triple therapy   Feeling better since then          Iron deficiency anemia     Took iron supplement for 1 month and she stopped on her own   Recheck labs         Relevant Orders    Iron Panel (Includes Ferritin, Iron Sat%, Iron, and TIBC)    CBC and differential    Abnormal celiac antibody panel     TtiGG was positive  Waiting to get endoscopy         Other Visit Diagnoses     Annual physical exam    -  Primary    Epigastric abdominal pain        Relevant Medications    omeprazole (PriLOSEC) 40 MG capsule          Immunizations and preventive care screenings were discussed with patient today. Appropriate education was printed on patient's after visit summary. Counseling:  Alcohol/drug use: discussed moderation in alcohol intake, the recommendations for healthy alcohol use, and avoidance of illicit drug use. Dental Health: discussed importance of regular tooth brushing, flossing, and dental visits. Injury prevention: discussed safety/seat belts, safety helmets, smoke detectors, carbon dioxide detectors, and smoking near bedding or upholstery. Sexual health: discussed sexually transmitted diseases, partner selection, use of condoms, avoidance of unintended pregnancy, and contraceptive alternatives. Exercise: the importance of regular exercise/physical activity was discussed. Recommend exercise 3-5 times per week for at least 30 minutes. No follow-ups on file.      Chief Complaint:     Chief Complaint   Patient presents with   • Physical Exam     Patient being seen for Physical Exam      History of Present Illness:     Adult Annual Physical   Patient here for a comprehensive physical exam. The patient reports problems - right sided headaches and nausea for 2 weeks. No vomiting . + light sensitivity . Eating helps with headaches  Stopped taking iron pill two weeks ago as the script ran out   Was diagnosed with H pylori     Diet and Physical Activity  Diet/Nutrition: well balanced diet. Exercise: walking. Depression Screening  PHQ-2/9 Depression Screening         General Health  Sleep: sleeps well. Hearing: normal - bilateral.  Vision: goes for regular eye exams. Dental: regular dental visits. /GYN Health  Follows with gynecology? no   Patient is: premenopausal  Last menstrual period:   Contraceptive method:  none . Advanced Care Planning  Do you have an advanced directive? no  Do you have a durable medical power of ? no     Review of Systems:     Review of Systems   Constitutional: Negative. HENT: Negative. Eyes: Negative. Respiratory: Negative. Cardiovascular: Negative. Gastrointestinal: Negative. Endocrine: Negative. Genitourinary: Negative. Musculoskeletal: Negative. Skin: Negative. Allergic/Immunologic: Negative. Neurological:  Positive for headaches. Hematological: Negative. Psychiatric/Behavioral: Negative.         Past Medical History:     Past Medical History:   Diagnosis Date   • Epigastric abdominal pain 10/11/2023      Past Surgical History:     Past Surgical History:   Procedure Laterality Date   •  SECTION        Social History:     Social History     Socioeconomic History   • Marital status:      Spouse name: None   • Number of children: None   • Years of education: None   • Highest education level: None   Occupational History   • None   Tobacco Use   • Smoking status: Never   • Smokeless tobacco: Never   Vaping Use   • Vaping Use: Never used   Substance and Sexual Activity   • Alcohol use: No   • Drug use: No   • Sexual activity: Yes     Partners: Male   Other Topics Concern • None   Social History Narrative   • None     Social Determinants of Health     Financial Resource Strain: Not on file   Food Insecurity: Not on file   Transportation Needs: Not on file   Physical Activity: Not on file   Stress: Not on file   Social Connections: Not on file   Intimate Partner Violence: Not on file   Housing Stability: Not on file      Family History:     History reviewed. No pertinent family history. Current Medications:     Current Outpatient Medications   Medication Sig Dispense Refill   • omeprazole (PriLOSEC) 40 MG capsule Take 1 capsule (40 mg total) by mouth daily before breakfast 30 capsule 5   • ferrous sulfate 324 (65 Fe) mg Take 1 tablet (324 mg total) by mouth daily before breakfast (Patient not taking: Reported on 11/29/2023) 60 tablet 0   • polyethylene glycol (Golytely) 4000 mL solution Take 4,000 mL by mouth once for 1 dose Take 4000 mL by mouth once for 1 dose. Use as directed (Patient not taking: Reported on 11/29/2023) 4000 mL 0     No current facility-administered medications for this visit. Allergies:     No Known Allergies   Physical Exam:     BP 98/60 (BP Location: Left arm, Patient Position: Sitting, Cuff Size: Standard)   Pulse (!) 54   Temp 98.5 °F (36.9 °C) (Tympanic)   Resp 18   Ht 5' 2.64" (1.591 m)   Wt 65.1 kg (143 lb 9.6 oz)   SpO2 100%   BMI 25.73 kg/m²     Physical Exam  Vitals and nursing note reviewed. Constitutional:       General: She is not in acute distress. Appearance: Normal appearance. She is well-developed. HENT:      Head: Normocephalic and atraumatic. Right Ear: Ear canal normal.      Left Ear: Ear canal normal.      Nose: Nose normal.      Mouth/Throat:      Mouth: Mucous membranes are moist.   Eyes:      Conjunctiva/sclera: Conjunctivae normal.   Cardiovascular:      Rate and Rhythm: Normal rate and regular rhythm. Pulses: Normal pulses. Heart sounds: No murmur heard.   Pulmonary:      Effort: Pulmonary effort is normal. No respiratory distress. Breath sounds: Normal breath sounds. Abdominal:      Palpations: Abdomen is soft. Tenderness: There is no abdominal tenderness. Musculoskeletal:         General: No swelling. Normal range of motion. Cervical back: Normal range of motion and neck supple. Skin:     General: Skin is warm and dry. Capillary Refill: Capillary refill takes less than 2 seconds. Neurological:      General: No focal deficit present. Mental Status: She is alert and oriented to person, place, and time.    Psychiatric:         Mood and Affect: Mood normal.          Dwayne Toledo MD  [address] [place] [address]

## 2023-11-30 DIAGNOSIS — D50.8 OTHER IRON DEFICIENCY ANEMIA: ICD-10-CM

## 2023-11-30 RX ORDER — FERROUS SULFATE 324(65)MG
324 TABLET, DELAYED RELEASE (ENTERIC COATED) ORAL
Qty: 90 TABLET | Refills: 3 | Status: SHIPPED | OUTPATIENT
Start: 2023-11-30

## 2023-12-01 ENCOUNTER — TELEPHONE (OUTPATIENT)
Dept: FAMILY MEDICINE CLINIC | Facility: CLINIC | Age: 43
End: 2023-12-01

## 2023-12-01 NOTE — TELEPHONE ENCOUNTER
Patient was called and made aware of her lab results.     ----- Message from Murtaza Espinal MD sent at 11/30/2023  8:25 AM EST -----  Her lab showed anemia and low iron   I would recommend her starting iron pill again daily     Dr Hui Ansari

## 2025-03-18 ENCOUNTER — OFFICE VISIT (OUTPATIENT)
Dept: FAMILY MEDICINE CLINIC | Facility: CLINIC | Age: 45
End: 2025-03-18
Payer: COMMERCIAL

## 2025-03-18 VITALS
OXYGEN SATURATION: 100 % | BODY MASS INDEX: 27.78 KG/M2 | HEART RATE: 62 BPM | WEIGHT: 156.8 LBS | HEIGHT: 63 IN | SYSTOLIC BLOOD PRESSURE: 100 MMHG | DIASTOLIC BLOOD PRESSURE: 58 MMHG | TEMPERATURE: 97.4 F

## 2025-03-18 DIAGNOSIS — R31.29 MICROSCOPIC HEMATURIA: ICD-10-CM

## 2025-03-18 DIAGNOSIS — R42 DIZZINESS: ICD-10-CM

## 2025-03-18 DIAGNOSIS — R10.13 EPIGASTRIC ABDOMINAL PAIN: ICD-10-CM

## 2025-03-18 DIAGNOSIS — Z13.29 THYROID DISORDER SCREEN: ICD-10-CM

## 2025-03-18 DIAGNOSIS — R82.90 FOUL SMELLING URINE: ICD-10-CM

## 2025-03-18 DIAGNOSIS — Z12.31 ENCOUNTER FOR SCREENING MAMMOGRAM FOR BREAST CANCER: ICD-10-CM

## 2025-03-18 DIAGNOSIS — Z13.220 LIPID SCREENING: ICD-10-CM

## 2025-03-18 DIAGNOSIS — D50.8 OTHER IRON DEFICIENCY ANEMIA: ICD-10-CM

## 2025-03-18 DIAGNOSIS — R39.9 UTI SYMPTOMS: Primary | ICD-10-CM

## 2025-03-18 DIAGNOSIS — Z12.4 SCREENING FOR CERVICAL CANCER: ICD-10-CM

## 2025-03-18 LAB
SL AMB  POCT GLUCOSE, UA: NORMAL
SL AMB LEUKOCYTE ESTERASE,UA: NORMAL
SL AMB POCT BILIRUBIN,UA: NORMAL
SL AMB POCT BLOOD,UA: NORMAL
SL AMB POCT CLARITY,UA: CLEAR
SL AMB POCT COLOR,UA: YELLOW
SL AMB POCT KETONES,UA: NORMAL
SL AMB POCT NITRITE,UA: NORMAL
SL AMB POCT PH,UA: 5.5
SL AMB POCT SPECIFIC GRAVITY,UA: 1.03
SL AMB POCT URINE PROTEIN: NORMAL
SL AMB POCT UROBILINOGEN: 0.2

## 2025-03-18 PROCEDURE — 87086 URINE CULTURE/COLONY COUNT: CPT | Performed by: FAMILY MEDICINE

## 2025-03-18 PROCEDURE — 99215 OFFICE O/P EST HI 40 MIN: CPT | Performed by: FAMILY MEDICINE

## 2025-03-18 PROCEDURE — 81002 URINALYSIS NONAUTO W/O SCOPE: CPT | Performed by: FAMILY MEDICINE

## 2025-03-18 PROCEDURE — 87077 CULTURE AEROBIC IDENTIFY: CPT | Performed by: FAMILY MEDICINE

## 2025-03-18 PROCEDURE — 87186 SC STD MICRODIL/AGAR DIL: CPT | Performed by: FAMILY MEDICINE

## 2025-03-18 RX ORDER — OMEPRAZOLE 40 MG/1
40 CAPSULE, DELAYED RELEASE ORAL
Qty: 30 CAPSULE | Refills: 5 | Status: ON HOLD | OUTPATIENT
Start: 2025-03-18 | End: 2025-09-14

## 2025-03-18 NOTE — ASSESSMENT & PLAN NOTE
Not taking her iron supplement     Orders:  •  CBC and differential  •  Iron Panel (Includes Ferritin, Iron Sat%, Iron, and TIBC); Future  •  Ambulatory Referral to Gastroenterology; Future

## 2025-03-18 NOTE — PROGRESS NOTES
Name: Karine Villarreal      : 1980      MRN: [id number]  Encounter Provider: Gloria Fernandez MD  Encounter Date: 3/18/2025   Encounter department: [facility]  :  Assessment & Plan  UTI symptoms  Ongoing for 1 year   Urine test showed  microscopic blood   Patient is not on Periods at this time   To get ultrasound to r/o kidney stones   Orders:  •  POCT urine dip    Microscopic hematuria  To check for kidney stones     Orders:  •  US kidney and bladder; Future    Dizziness  ? Due to low iron   She is not taking any supplements   To check labs         Other iron deficiency anemia  Not taking her iron supplement     Orders:  •  CBC and differential  •  Iron Panel (Includes Ferritin, Iron Sat%, Iron, and TIBC); Future  •  Ambulatory Referral to Gastroenterology; Future    Epigastric abdominal pain  Ongoing on and off  To start omeprazole again   Referral to GI for further evaluation     Orders:  •  omeprazole (PriLOSEC) 40 MG capsule; Take 1 capsule (40 mg total) by mouth daily before breakfast  •  Ambulatory Referral to Gastroenterology; Future  •  H. pylori antigen, stool; Future    Foul smelling urine  Will check for kidney function and diabetes    Orders:  •  Comprehensive metabolic panel  •  Hemoglobin A1C  •  US kidney and bladder; Future  •  Urine culture    Thyroid disorder screen    Orders:  •  TSH, 3rd generation with Free T4 reflex    Lipid screening    Orders:  •  Lipid panel    Encounter for screening mammogram for breast cancer    Orders:  •  Mammo screening bilateral w 3d and cad; Future    Screening for cervical cancer    Orders:  •  Ambulatory referral to Obstetrics / Gynecology; Future           History of Present Illness   Dizziness (Pt has been having symptoms for 1 week . )  Nausea  Headache  Foul Urine smell (Pt has been having foul urine smell x 1 year )  GENERALIZED Abdominal Pain X 1 WEEK     Dizziness  Associated symptoms include abdominal pain, headaches, nausea  "and urinary symptoms. Pertinent negatives include no anorexia, arthralgias, change in bowel habit, chest pain, chills, congestion, coughing, diaphoresis, fatigue, fever, joint swelling, myalgias, neck pain, numbness, rash, sore throat, swollen glands, vertigo, visual change, vomiting or weakness.   Nausea  Associated symptoms include abdominal pain, headaches, nausea and urinary symptoms. Pertinent negatives include no anorexia, arthralgias, change in bowel habit, chest pain, chills, congestion, coughing, diaphoresis, fatigue, fever, joint swelling, myalgias, neck pain, numbness, rash, sore throat, swollen glands, vertigo, visual change, vomiting or weakness.   Headache  Abdominal Pain  Associated symptoms include headaches and nausea. Pertinent negatives include no anorexia, arthralgias, fever, myalgias or vomiting.     Review of Systems   Constitutional: Negative.  Negative for chills, diaphoresis, fatigue and fever.   HENT: Negative.  Negative for congestion and sore throat.    Respiratory: Negative.  Negative for cough.    Cardiovascular: Negative.  Negative for chest pain.   Gastrointestinal:  Positive for abdominal pain and nausea. Negative for anorexia, change in bowel habit and vomiting.   Musculoskeletal:  Negative for arthralgias, joint swelling, myalgias and neck pain.   Skin:  Negative for rash.   Neurological:  Positive for dizziness and headaches. Negative for vertigo, weakness and numbness.       Objective   /58 (BP Location: Left arm, Patient Position: Sitting, Cuff Size: Standard)   Pulse 62   Temp (!) 97.4 °F (36.3 °C) (Tympanic)   Ht 5' 3\" (1.6 m)   Wt 71.1 kg (156 lb 12.8 oz)   SpO2 100%   BMI 27.78 kg/m²      Physical Exam  Vitals and nursing note reviewed.   Constitutional:       Appearance: She is well-developed.   Cardiovascular:      Rate and Rhythm: Normal rate and regular rhythm.      Pulses: Normal pulses.      Heart sounds: Normal heart sounds.   Pulmonary:      Effort: " Pulmonary effort is normal.      Breath sounds: Normal breath sounds.   Abdominal:      Tenderness: There is abdominal tenderness.      Comments: epigastric   Neurological:      General: No focal deficit present.      Mental Status: She is alert and oriented to person, place, and time.   Psychiatric:         Mood and Affect: Mood normal.         Behavior: Behavior normal.       I have spent a total time of 40 minutes in caring for this patient on the day of the visit/encounter including Diagnostic results, Risks and benefits of tx options, Instructions for management, Patient and family education, Importance of tx compliance, Counseling / Coordination of care, and Documenting in the medical record.

## 2025-03-20 ENCOUNTER — DOCUMENTATION (OUTPATIENT)
Dept: FAMILY MEDICINE CLINIC | Facility: CLINIC | Age: 45
End: 2025-03-20

## 2025-03-20 ENCOUNTER — RESULTS FOLLOW-UP (OUTPATIENT)
Dept: FAMILY MEDICINE CLINIC | Facility: CLINIC | Age: 45
End: 2025-03-20

## 2025-03-20 DIAGNOSIS — R39.9 UTI SYMPTOMS: Primary | ICD-10-CM

## 2025-03-20 LAB — BACTERIA UR CULT: ABNORMAL

## 2025-03-20 RX ORDER — CIPROFLOXACIN 500 MG/1
500 TABLET, FILM COATED ORAL EVERY 12 HOURS SCHEDULED
Qty: 14 TABLET | Refills: 0 | Status: SHIPPED | OUTPATIENT
Start: 2025-03-20 | End: 2025-03-27

## 2025-03-21 ENCOUNTER — APPOINTMENT (OUTPATIENT)
Dept: LAB | Facility: CLINIC | Age: 45
End: 2025-03-21
Payer: COMMERCIAL

## 2025-03-21 DIAGNOSIS — D50.8 OTHER IRON DEFICIENCY ANEMIA: ICD-10-CM

## 2025-03-21 LAB
ALBUMIN SERPL BCG-MCNC: 4.1 G/DL (ref 3.5–5)
ALP SERPL-CCNC: 48 U/L (ref 34–104)
ALT SERPL W P-5'-P-CCNC: 12 U/L (ref 7–52)
ANION GAP SERPL CALCULATED.3IONS-SCNC: 6 MMOL/L (ref 4–13)
AST SERPL W P-5'-P-CCNC: 15 U/L (ref 13–39)
BASOPHILS # BLD AUTO: 0.05 THOUSANDS/ÂΜL (ref 0–0.1)
BASOPHILS NFR BLD AUTO: 1 % (ref 0–1)
BILIRUB SERPL-MCNC: 0.37 MG/DL (ref 0.2–1)
BUN SERPL-MCNC: 12 MG/DL (ref 5–25)
CALCIUM SERPL-MCNC: 9.1 MG/DL (ref 8.4–10.2)
CHLORIDE SERPL-SCNC: 109 MMOL/L (ref 96–108)
CHOLEST SERPL-MCNC: 144 MG/DL (ref ?–200)
CO2 SERPL-SCNC: 24 MMOL/L (ref 21–32)
CREAT SERPL-MCNC: 0.67 MG/DL (ref 0.6–1.3)
EOSINOPHIL # BLD AUTO: 0.21 THOUSAND/ÂΜL (ref 0–0.61)
EOSINOPHIL NFR BLD AUTO: 3 % (ref 0–6)
ERYTHROCYTE [DISTWIDTH] IN BLOOD BY AUTOMATED COUNT: 19 % (ref 11.6–15.1)
EST. AVERAGE GLUCOSE BLD GHB EST-MCNC: 111 MG/DL
FERRITIN SERPL-MCNC: 2 NG/ML (ref 11–307)
GFR SERPL CREATININE-BSD FRML MDRD: 108 ML/MIN/1.73SQ M
GLUCOSE P FAST SERPL-MCNC: 83 MG/DL (ref 65–99)
HBA1C MFR BLD: 5.5 %
HCT VFR BLD AUTO: 34.2 % (ref 34.8–46.1)
HDLC SERPL-MCNC: 45 MG/DL
HGB BLD-MCNC: 9.5 G/DL (ref 11.5–15.4)
IMM GRANULOCYTES # BLD AUTO: 0.02 THOUSAND/UL (ref 0–0.2)
IMM GRANULOCYTES NFR BLD AUTO: 0 % (ref 0–2)
IRON SATN MFR SERPL: 2 % (ref 15–50)
IRON SERPL-MCNC: 11 UG/DL (ref 50–212)
LDLC SERPL CALC-MCNC: 77 MG/DL (ref 0–100)
LYMPHOCYTES # BLD AUTO: 2.31 THOUSANDS/ÂΜL (ref 0.6–4.47)
LYMPHOCYTES NFR BLD AUTO: 30 % (ref 14–44)
MCH RBC QN AUTO: 20.6 PG (ref 26.8–34.3)
MCHC RBC AUTO-ENTMCNC: 27.8 G/DL (ref 31.4–37.4)
MCV RBC AUTO: 74 FL (ref 82–98)
MONOCYTES # BLD AUTO: 0.54 THOUSAND/ÂΜL (ref 0.17–1.22)
MONOCYTES NFR BLD AUTO: 7 % (ref 4–12)
NEUTROPHILS # BLD AUTO: 4.7 THOUSANDS/ÂΜL (ref 1.85–7.62)
NEUTS SEG NFR BLD AUTO: 59 % (ref 43–75)
NONHDLC SERPL-MCNC: 99 MG/DL
NRBC BLD AUTO-RTO: 0 /100 WBCS
PLATELET # BLD AUTO: 347 THOUSANDS/UL (ref 149–390)
POTASSIUM SERPL-SCNC: 4 MMOL/L (ref 3.5–5.3)
PROT SERPL-MCNC: 7.3 G/DL (ref 6.4–8.4)
RBC # BLD AUTO: 4.62 MILLION/UL (ref 3.81–5.12)
SODIUM SERPL-SCNC: 139 MMOL/L (ref 135–147)
TIBC SERPL-MCNC: 491.4 UG/DL (ref 250–450)
TRANSFERRIN SERPL-MCNC: 351 MG/DL (ref 203–362)
TRIGL SERPL-MCNC: 110 MG/DL (ref ?–150)
TSH SERPL DL<=0.05 MIU/L-ACNC: 0.96 UIU/ML (ref 0.45–4.5)
UIBC SERPL-MCNC: 480 UG/DL (ref 155–355)
WBC # BLD AUTO: 7.83 THOUSAND/UL (ref 4.31–10.16)

## 2025-03-21 PROCEDURE — 82728 ASSAY OF FERRITIN: CPT

## 2025-03-21 PROCEDURE — 84443 ASSAY THYROID STIM HORMONE: CPT | Performed by: FAMILY MEDICINE

## 2025-03-21 PROCEDURE — 85025 COMPLETE CBC W/AUTO DIFF WBC: CPT | Performed by: FAMILY MEDICINE

## 2025-03-21 PROCEDURE — 80053 COMPREHEN METABOLIC PANEL: CPT | Performed by: FAMILY MEDICINE

## 2025-03-21 PROCEDURE — 80061 LIPID PANEL: CPT | Performed by: FAMILY MEDICINE

## 2025-03-21 PROCEDURE — 83540 ASSAY OF IRON: CPT

## 2025-03-21 PROCEDURE — 83550 IRON BINDING TEST: CPT

## 2025-03-21 PROCEDURE — 83036 HEMOGLOBIN GLYCOSYLATED A1C: CPT | Performed by: FAMILY MEDICINE

## 2025-03-21 PROCEDURE — 36415 COLL VENOUS BLD VENIPUNCTURE: CPT | Performed by: FAMILY MEDICINE

## 2025-03-24 ENCOUNTER — RESULTS FOLLOW-UP (OUTPATIENT)
Dept: FAMILY MEDICINE CLINIC | Facility: CLINIC | Age: 45
End: 2025-03-24

## 2025-03-27 ENCOUNTER — TELEPHONE (OUTPATIENT)
Age: 45
End: 2025-03-27

## 2025-03-27 NOTE — TELEPHONE ENCOUNTER
Pt called stating she wanting a call from Dr. Fernandez.  She said she knows her iron is very ow but she feels very run down.  She wanted to talk with Dr. Fernandez directly if possible.      Please advise

## 2025-03-28 DIAGNOSIS — D50.8 OTHER IRON DEFICIENCY ANEMIA: Primary | ICD-10-CM

## 2025-03-28 RX ORDER — SODIUM CHLORIDE 9 MG/ML
20 INJECTION, SOLUTION INTRAVENOUS ONCE
OUTPATIENT
Start: 2025-03-28

## 2025-03-29 ENCOUNTER — HOSPITAL ENCOUNTER (INPATIENT)
Facility: HOSPITAL | Age: 45
LOS: 1 days | Discharge: HOME | End: 2025-03-31
Attending: EMERGENCY MEDICINE | Admitting: INTERNAL MEDICINE
Payer: COMMERCIAL

## 2025-03-29 DIAGNOSIS — D50.9 IRON DEFICIENCY ANEMIA: ICD-10-CM

## 2025-03-29 DIAGNOSIS — A04.8 H. PYLORI INFECTION: ICD-10-CM

## 2025-03-29 DIAGNOSIS — R53.83 FATIGUE: Primary | ICD-10-CM

## 2025-03-29 DIAGNOSIS — D50.8 OTHER IRON DEFICIENCY ANEMIA: ICD-10-CM

## 2025-03-29 DIAGNOSIS — R71.8 SCHISTOCYTOSIS: ICD-10-CM

## 2025-03-29 LAB
ALBUMIN SERPL BCG-MCNC: 3.7 G/DL (ref 3.5–5)
ALP SERPL-CCNC: 44 U/L (ref 34–104)
ALT SERPL W P-5'-P-CCNC: 11 U/L (ref 7–52)
ANION GAP SERPL CALCULATED.3IONS-SCNC: 5 MMOL/L (ref 4–13)
ANISOCYTOSIS BLD QL SMEAR: PRESENT
AST SERPL W P-5'-P-CCNC: 14 U/L (ref 13–39)
BASOPHILS # BLD MANUAL: 0.07 THOUSAND/UL (ref 0–0.1)
BASOPHILS NFR MAR MANUAL: 1 % (ref 0–1)
BILIRUB SERPL-MCNC: 0.24 MG/DL (ref 0.2–1)
BILIRUB UR QL STRIP: NEGATIVE
BLD SMEAR INTERP: NORMAL
BUN SERPL-MCNC: 12 MG/DL (ref 5–25)
CALCIUM SERPL-MCNC: 8.5 MG/DL (ref 8.4–10.2)
CHLORIDE SERPL-SCNC: 111 MMOL/L (ref 96–108)
CLARITY UR: CLEAR
CO2 SERPL-SCNC: 21 MMOL/L (ref 21–32)
COLOR UR: COLORLESS
CREAT SERPL-MCNC: 0.65 MG/DL (ref 0.6–1.3)
DAT POLY-SP REAG RBC QL: NEGATIVE
DIFFERENTIAL COMMENT: ABNORMAL
EOSINOPHIL # BLD MANUAL: 0.13 THOUSAND/UL (ref 0–0.4)
EOSINOPHIL NFR BLD MANUAL: 2 % (ref 0–6)
ERYTHROCYTE [DISTWIDTH] IN BLOOD BY AUTOMATED COUNT: 18.9 % (ref 11.6–15.1)
EXT PREGNANCY TEST URINE: NEGATIVE
EXT. CONTROL: NORMAL
FLUAV RNA RESP QL NAA+PROBE: NEGATIVE
FLUBV RNA RESP QL NAA+PROBE: NEGATIVE
GFR SERPL CREATININE-BSD FRML MDRD: 109 ML/MIN/1.73SQ M
GLUCOSE SERPL-MCNC: 98 MG/DL (ref 65–140)
GLUCOSE UR STRIP-MCNC: NEGATIVE MG/DL
HCT VFR BLD AUTO: 30.9 % (ref 34.8–46.1)
HEMOCCULT STL QL IA: POSITIVE
HGB BLD-MCNC: 8.8 G/DL (ref 11.5–15.4)
HGB RETIC QN AUTO: 21.1 PG (ref 30–38.3)
HGB UR QL STRIP.AUTO: NEGATIVE
HYPERCHROMIA BLD QL SMEAR: PRESENT
IMM RETICS NFR: 28.2 % (ref 0–14)
KETONES UR STRIP-MCNC: NEGATIVE MG/DL
LDH SERPL-CCNC: 157 U/L (ref 140–271)
LEUKOCYTE ESTERASE UR QL STRIP: NEGATIVE
LIPASE SERPL-CCNC: 24 U/L (ref 11–82)
LYMPHOCYTES # BLD AUTO: 2.66 THOUSAND/UL (ref 0.6–4.47)
LYMPHOCYTES # BLD AUTO: 39 % (ref 14–44)
MAGNESIUM SERPL-MCNC: 2 MG/DL (ref 1.9–2.7)
MCH RBC QN AUTO: 20.8 PG (ref 26.8–34.3)
MCHC RBC AUTO-ENTMCNC: 28.5 G/DL (ref 31.4–37.4)
MCV RBC AUTO: 73 FL (ref 82–98)
MONOCYTES # BLD AUTO: 0.6 THOUSAND/UL (ref 0–1.22)
MONOCYTES NFR BLD: 9 % (ref 4–12)
NEUTROPHILS # BLD MANUAL: 3.2 THOUSAND/UL (ref 1.85–7.62)
NEUTS SEG NFR BLD AUTO: 48 % (ref 43–75)
NITRITE UR QL STRIP: NEGATIVE
OVALOCYTES BLD QL SMEAR: PRESENT
PATHOLOGY REVIEW: YES
PH UR STRIP.AUTO: 6 [PH]
PLATELET # BLD AUTO: 327 THOUSANDS/UL (ref 149–390)
PLATELET BLD QL SMEAR: ADEQUATE
PMV BLD AUTO: 11.2 FL (ref 8.9–12.7)
POIKILOCYTOSIS BLD QL SMEAR: PRESENT
POTASSIUM SERPL-SCNC: 4.2 MMOL/L (ref 3.5–5.3)
PROT SERPL-MCNC: 6.6 G/DL (ref 6.4–8.4)
PROT UR STRIP-MCNC: NEGATIVE MG/DL
RBC # BLD AUTO: 4.23 MILLION/UL (ref 3.81–5.12)
RBC MORPH BLD: PRESENT
RETICS # AUTO: ABNORMAL 10*3/UL (ref 14097–95744)
RETICS # CALC: 1.53 % (ref 0.37–1.87)
RSV RNA RESP QL NAA+PROBE: NEGATIVE
SARS-COV-2 RNA RESP QL NAA+PROBE: NEGATIVE
SCHISTOCYTES BLD QL SMEAR: PRESENT
SODIUM SERPL-SCNC: 137 MMOL/L (ref 135–147)
SP GR UR STRIP.AUTO: 1 (ref 1–1.03)
TARGETS BLD QL SMEAR: PRESENT
TSH SERPL DL<=0.05 MIU/L-ACNC: 3.2 UIU/ML (ref 0.45–4.5)
UROBILINOGEN UR STRIP-ACNC: <2 MG/DL
VARIANT LYMPHS # BLD AUTO: 1 %
WBC # BLD AUTO: 6.66 THOUSAND/UL (ref 4.31–10.16)

## 2025-03-29 PROCEDURE — 96361 HYDRATE IV INFUSION ADD-ON: CPT

## 2025-03-29 PROCEDURE — G0328 FECAL BLOOD SCRN IMMUNOASSAY: HCPCS

## 2025-03-29 PROCEDURE — 83735 ASSAY OF MAGNESIUM: CPT

## 2025-03-29 PROCEDURE — 0241U HB NFCT DS VIR RESP RNA 4 TRGT: CPT

## 2025-03-29 PROCEDURE — 99285 EMERGENCY DEPT VISIT HI MDM: CPT | Performed by: EMERGENCY MEDICINE

## 2025-03-29 PROCEDURE — 86880 COOMBS TEST DIRECT: CPT | Performed by: STUDENT IN AN ORGANIZED HEALTH CARE EDUCATION/TRAINING PROGRAM

## 2025-03-29 PROCEDURE — 84443 ASSAY THYROID STIM HORMONE: CPT

## 2025-03-29 PROCEDURE — 81003 URINALYSIS AUTO W/O SCOPE: CPT

## 2025-03-29 PROCEDURE — 83010 ASSAY OF HAPTOGLOBIN QUANT: CPT | Performed by: STUDENT IN AN ORGANIZED HEALTH CARE EDUCATION/TRAINING PROGRAM

## 2025-03-29 PROCEDURE — 36415 COLL VENOUS BLD VENIPUNCTURE: CPT

## 2025-03-29 PROCEDURE — 83690 ASSAY OF LIPASE: CPT

## 2025-03-29 PROCEDURE — 85007 BL SMEAR W/DIFF WBC COUNT: CPT

## 2025-03-29 PROCEDURE — 83615 LACTATE (LD) (LDH) ENZYME: CPT

## 2025-03-29 PROCEDURE — 80053 COMPREHEN METABOLIC PANEL: CPT

## 2025-03-29 PROCEDURE — 83020 HEMOGLOBIN ELECTROPHORESIS: CPT

## 2025-03-29 PROCEDURE — 93005 ELECTROCARDIOGRAM TRACING: CPT

## 2025-03-29 PROCEDURE — 85046 RETICYTE/HGB CONCENTRATE: CPT | Performed by: STUDENT IN AN ORGANIZED HEALTH CARE EDUCATION/TRAINING PROGRAM

## 2025-03-29 PROCEDURE — 96365 THER/PROPH/DIAG IV INF INIT: CPT

## 2025-03-29 PROCEDURE — 81025 URINE PREGNANCY TEST: CPT

## 2025-03-29 PROCEDURE — 85027 COMPLETE CBC AUTOMATED: CPT

## 2025-03-29 PROCEDURE — 99223 1ST HOSP IP/OBS HIGH 75: CPT | Performed by: INTERNAL MEDICINE

## 2025-03-29 PROCEDURE — 99285 EMERGENCY DEPT VISIT HI MDM: CPT

## 2025-03-29 PROCEDURE — 87338 HPYLORI STOOL AG IA: CPT

## 2025-03-29 RX ORDER — PANTOPRAZOLE SODIUM 40 MG/1
40 TABLET, DELAYED RELEASE ORAL
Status: DISCONTINUED | OUTPATIENT
Start: 2025-03-29 | End: 2025-03-31 | Stop reason: HOSPADM

## 2025-03-29 RX ORDER — ACETAMINOPHEN 325 MG/1
650 TABLET ORAL EVERY 6 HOURS PRN
Status: DISCONTINUED | OUTPATIENT
Start: 2025-03-29 | End: 2025-03-31 | Stop reason: HOSPADM

## 2025-03-29 RX ADMIN — PANTOPRAZOLE SODIUM 40 MG: 40 TABLET, DELAYED RELEASE ORAL at 06:53

## 2025-03-29 RX ADMIN — IRON SUCROSE 200 MG: 20 INJECTION, SOLUTION INTRAVENOUS at 03:53

## 2025-03-29 RX ADMIN — SODIUM CHLORIDE 1000 ML: 0.9 INJECTION, SOLUTION INTRAVENOUS at 02:28

## 2025-03-29 NOTE — ED PROVIDER NOTES
"Time reflects when diagnosis was documented in both MDM as applicable and the Disposition within this note       Time User Action Codes Description Comment    3/29/2025  3:51 AM Dallas Ruiz [R53.83] Fatigue     3/29/2025  3:51 AM Dallas Ruiz [D50.9] Iron deficiency anemia     3/29/2025  5:18 AM Dallas Ruiz [R71.8] Schistocytosis           ED Disposition       ED Disposition   Admit    Condition   Stable    Date/Time   Sat Mar 29, 2025  5:17 AM    Comment   Case was discussed with  and the patient's admission status was agreed to be Admission Status: observation status to the service of Dr. Slaughter .               Assessment & Plan       Medical Decision Making  Patient is a 44-year-old female with past medical history of iron deficiency anemia presenting to emergency department with reports of feeling like \"her body is shutting down and it feels hard to explain.  Patient states that she has been having troubles waking up with blurred vision, dizziness, shortness of breath.  Patient has noticed increased lethargy or fatigue over the past week stating that she has not felt like herself. Physical exam shows lungs are clear auscultation.  No nasal congestion or posterior oropharyngeal erythema patient shows no abdominal tenderness at this time. Normal active bowel sounds without rebound or guarding.  Neuroexam intact with strength and sensorium intact, cranial nerves II through XII intact without any focal deficits. DDx including but not limited to: metabolic abnormality, dehydration, viral illness, anemia, thyroid disease, intracranial process, other infectious process including UTI; doubt Guillain [place] syndrome or myasthenia gravis or botulism or multiple sclerosis or transverse myelitis. CBC does show worsening of patient's chronic anemia 9.5 down 8.8.  CMP, urine pride, UA, magnesium all within normal limits.  COVID/flu/RSV testing negative.  TSH normal.  Findings most consistent with worsening " of patient's chronic iron deficiency anemia becoming symptomatic causing fatigue lethargy and shortness of breath symptoms with a lack of physical exam findings.  Patient was started on iron and vitamin C and set up for iron infusions by her PCP.  Discussed findings with  Dr. Min and Dr. Silverio with internal medicine of outpatient hematology follow-up versus inpatient admission for symptomatic anemia.  Upon initial evaluation by patient, they stated that there was nothing else like provided to patient besides Venofer infusion.  Upon getting ready for discharge pathology reached out with peripheral smear stating that there was rare schistocytes in her peripheral blood smear. No definitive blasts seen.  Pathology recommends watching her hemoglobin and platelets for drops and have hemeonc see the patient.  Discussed findings with internal medicine, patient admitted to for further evaluation by hematology oncology.  Discussed findings with patient, patient admitted to observation under Dr. Slaughter with internal medicine.    Amount and/or Complexity of Data Reviewed  Labs: ordered.    Risk  Decision regarding hospitalization.             Medications   sodium chloride 0.9 % bolus 1,000 mL (0 mL Intravenous Stopped 3/29/25 0329)   iron sucrose (VENOFER) 200 mg in sodium chloride 0.9 % 100 mL IVPB (0 mg Intravenous Stopped 3/29/25 0500)       ED Risk Strat Scores                        PERC Rule for PE      Flowsheet Row Most Recent Value   PERC Rule for PE    Age >=50 0 Filed at: 03/29/2025 0156   HR >=100 0 Filed at: 03/29/2025 0156   O2 Sat on room air < 95% 0 Filed at: 03/29/2025 0156   History of PE or DVT 0 Filed at: 03/29/2025 0156   Recent trauma or surgery 0 Filed at: 03/29/2025 0156   Hemoptysis 0 Filed at: 03/29/2025 0156   Exogenous estrogen 0 Filed at: 03/29/2025 0156   Unilateral leg swelling 0 Filed at: 03/29/2025 0156   PERC Rule for PE Results 0 Filed at: 03/29/2025 0156            SBIRT 20yo+   "    Flowsheet Row Most Recent Value   Initial Alcohol Screen: US AUDIT-C     1. How often do you have a drink containing alcohol? 0 Filed at: 2025   2. How many drinks containing alcohol do you have on a typical day you are drinking?  0 Filed at: 2025   3b. FEMALE Any Age, or MALE 65+: How often do you have 4 or more drinks on one occassion? 0 Filed at: 2025   Audit-C Score 0 Filed at: 2025   TOAN: How many times in the past year have you...    Used an illegal drug or used a prescription medication for non-medical reasons? Never Filed at: 2025                            History of Present Illness       Chief Complaint   Patient presents with    Weakness - Generalized     Pt from home, states she \"feels like her body is shutting down and it is hard to explain\" Pt states she had trouble waking up and calling for her mother to come get her. Pt denies CP, +dizziness, +SOB +blurry vision in both eyes. Pt also reports foul smelling urine and increased urgency and incontinence. Urinary symptoms for a month. Pt states she has been told this past week that she has low iron.        Past Medical History:   Diagnosis Date    Epigastric abdominal pain 10/11/2023      Past Surgical History:   Procedure Laterality Date     SECTION        History reviewed. No pertinent family history.   Social History     Tobacco Use    Smoking status: Never    Smokeless tobacco: Never   Vaping Use    Vaping status: Never Used   Substance Use Topics    Alcohol use: No    Drug use: No      E-Cigarette/Vaping    E-Cigarette Use Never User       E-Cigarette/Vaping Substances    Nicotine No     THC No     CBD No     Flavoring No     Other No     Unknown No       I have reviewed and agree with the history as documented.     44-year-old female with past medical history of iron deficiency anemia presenting to emergency department with reports of feeling like \"her body is shutting down and it " feels hard to explain.  Patient states that she has been having troubles waking up with blurred vision, dizziness, shortness of breath.  Patient has noticed increased lethargy or fatigue over the past week stating that she has not felt like herself.  Patient states that today she had trouble waking up and had to call for mother to come help her get up.  Patient also noticed associated increased frequency and urgency in urination. Patient denies any fever, body aches, chills, flank pain, hematuria, melanotic stools, or abdominal pain nausea or vomiting.          Review of Systems   Constitutional:  Positive for activity change and fatigue. Negative for appetite change, chills and fever.   HENT:  Negative for congestion, dental problem, ear discharge, ear pain, nosebleeds, postnasal drip, rhinorrhea, sinus pressure, sinus pain and sore throat.    Eyes:  Positive for visual disturbance. Negative for pain, discharge and itching.   Respiratory:  Positive for shortness of breath. Negative for cough, choking, chest tightness, wheezing and stridor.    Cardiovascular:  Negative for chest pain and palpitations.   Gastrointestinal:  Negative for abdominal pain, constipation, diarrhea, nausea and vomiting.   Genitourinary:  Positive for frequency and urgency. Negative for dysuria, flank pain, hematuria, menstrual problem, vaginal bleeding, vaginal discharge and vaginal pain.   Musculoskeletal:  Negative for arthralgias and back pain.   Skin:  Negative for color change and rash.   Neurological:  Positive for dizziness, weakness and light-headedness. Negative for tremors, seizures, syncope, facial asymmetry, speech difficulty, numbness and headaches.   All other systems reviewed and are negative.          Objective       ED Triage Vitals   Temperature Pulse Blood Pressure Respirations SpO2 Patient Position - Orthostatic VS   03/29/25 0121 03/29/25 0118 03/29/25 0118 03/29/25 0118 03/29/25 0118 03/29/25 0118   97.8 °F (36.6 °C) 72  140/69 18 100 % Sitting      Temp Source Heart Rate Source BP Location FiO2 (%) Pain Score    03/29/25 0121 03/29/25 0118 03/29/25 0118 -- --    Oral Monitor Right arm        Vitals      Date and Time Temp Pulse SpO2 Resp BP Pain Score FACES Pain Rating User   03/29/25 0459 -- 58 100 % 16 103/62 -- -- HVL   03/29/25 0353 -- 61 100 % 16 112/69 -- -- HVL   03/29/25 0130 -- 66 100 % 18 110/69 -- -- CG   03/29/25 0121 97.8 °F (36.6 °C) -- -- -- -- -- -- CG   03/29/25 0118 -- 72 100 % 18 140/69 -- -- CG            Physical Exam  Vitals and nursing note reviewed.   Constitutional:       General: She is not in acute distress.     Appearance: Normal appearance. She is well-developed. She is not ill-appearing, toxic-appearing or diaphoretic.   HENT:      Head: Normocephalic and atraumatic.      Right Ear: Tympanic membrane and external ear normal. There is no impacted cerumen.      Left Ear: Tympanic membrane and external ear normal. There is no impacted cerumen.      Nose: Nose normal. No congestion or rhinorrhea.      Mouth/Throat:      Mouth: Mucous membranes are moist.      Pharynx: Oropharynx is clear. No oropharyngeal exudate or posterior oropharyngeal erythema.   Eyes:      General:         Right eye: No discharge.         Left eye: No discharge.      Extraocular Movements: Extraocular movements intact.      Conjunctiva/sclera: Conjunctivae normal.      Pupils: Pupils are equal, round, and reactive to light.   Cardiovascular:      Rate and Rhythm: Normal rate and regular rhythm.      Pulses: Normal pulses.      Heart sounds: Normal heart sounds. No murmur heard.     No friction rub. No gallop.   Pulmonary:      Effort: Pulmonary effort is normal. No respiratory distress.      Breath sounds: Normal breath sounds. No stridor. No wheezing, rhonchi or rales.   Chest:      Chest wall: No tenderness.   Abdominal:      General: Abdomen is flat. There is no distension.      Palpations: Abdomen is soft.      Tenderness: There  is no abdominal tenderness. There is no right CVA tenderness, left CVA tenderness or guarding.   Musculoskeletal:         General: No swelling, tenderness, deformity or signs of injury.      Cervical back: Neck supple. No rigidity or tenderness.   Lymphadenopathy:      Cervical: No cervical adenopathy.   Skin:     General: Skin is warm and dry.      Capillary Refill: Capillary refill takes less than 2 seconds.      Coloration: Skin is not jaundiced or pale.      Findings: No bruising, erythema, lesion or rash.   Neurological:      Mental Status: She is alert and oriented to person, place, and time.      GCS: GCS eye subscore is 4. GCS verbal subscore is 5. GCS motor subscore is 6.      Cranial Nerves: No cranial nerve deficit, dysarthria or facial asymmetry.      Sensory: No sensory deficit.      Motor: No weakness, tremor, atrophy or abnormal muscle tone.      Coordination: Romberg sign negative. Coordination normal. Finger-Nose-Finger Test and Heel to Shin Test normal.      Gait: Gait normal.   Psychiatric:         Mood and Affect: Mood normal.         Results Reviewed       Procedure Component Value Units Date/Time    CBC and differential [[id number]]  (Abnormal) Collected: 03/29/25 0206    Lab Status: Final result Specimen: Blood from Arm, Left Updated: 03/29/25 0538     WBC 6.66 Thousand/uL      RBC 4.23 Million/uL      Hemoglobin 8.8 g/dL      Hematocrit 30.9 %      MCV 73 fL      MCH 20.8 pg      MCHC 28.5 g/dL      RDW 18.9 %      MPV 11.2 fL      Platelets 327 Thousands/uL     Narrative:      This is an appended report.  These results have been appended to a previously verified report.    Manual Differential(PHLEBS Do Not Order) [[id number]] Collected: 03/29/25 0206    Lab Status: In process Specimen: Blood from Arm, Left Updated: 03/29/25 0537    Retic Count with Reticulocyte HGB [[id number]]     Lab Status: No result Specimen: Blood     Hemolysis Smear [[id number]]     Lab Status: No result     LD,Blood  [[id number]]     Lab Status: No result Specimen: Blood     Haptoglobin [[id number]]     Lab Status: No result Specimen: Blood     FLU/RSV/COVID - if FLU/RSV clinically relevant (2hr TAT) [[id number]]  (Normal) Collected: 03/29/25 0136    Lab Status: Final result Specimen: Nares from Nose Updated: 03/29/25 0258     SARS-CoV-2 Negative     INFLUENZA A PCR Negative     INFLUENZA B PCR Negative     RSV PCR Negative    Narrative:      This test has been performed using the CoV-2/Flu/RSV plus assay on the [email or web address] platform. This test has been validated by the  and verified by the performing laboratory.     This test is designed to amplify and detect the following: nucleocapsid (N), envelope (E), and RNA-dependent RNA polymerase (RdRP) genes of the SARS-CoV-2 genome; matrix (M), basic polymerase (PB2), and acidic protein (PA) segments of the influenza A genome; matrix (M) and non-structural protein (NS) segments of the influenza B genome, and the nucleocapsid genes of RSV A and RSV B.     Positive results are indicative of the presence of Flu A, Flu B, RSV, and/or SARS-CoV-2 RNA. Positive results for SARS-CoV-2 or suspected novel influenza should be reported to state, local, or federal health departments according to local reporting requirements.      All results should be assessed in conjunction with clinical presentation and other laboratory markers for clinical management.     FOR PEDIATRIC PATIENTS - copy/paste COVID Guidelines URL to browser: [email or web address]       TSH, 3rd generation with Free T4 reflex [[id number]]  (Normal) Collected: 03/29/25 0206    Lab Status: Final result Specimen: Blood from Arm, Left Updated: 03/29/25 0249     TSH 3RD GENERATON 3.200 uIU/mL     Comprehensive metabolic panel [[id number]]  (Abnormal) Collected: 03/29/25 0206    Lab Status: Final result Specimen: Blood from Arm, Left Updated: 03/29/25 0233     Sodium 137  mmol/L      Potassium 4.2 mmol/L      Chloride 111 mmol/L      CO2 21 mmol/L      ANION GAP 5 mmol/L      BUN 12 mg/dL      Creatinine 0.65 mg/dL      Glucose 98 mg/dL      Calcium 8.5 mg/dL      AST 14 U/L      ALT 11 U/L      Alkaline Phosphatase 44 U/L      Total Protein 6.6 g/dL      Albumin 3.7 g/dL      Total Bilirubin 0.24 mg/dL      eGFR 109 ml/min/1.73sq m     Narrative:      [employer] Kid[employer] guidelines for Chronic Kidney Disease (CKD):     Stage 1 with normal or high GFR (GFR > 90 mL/min/1.73 square meters)    Stage 2 Mild CKD (GFR = 60-89 mL/min/1.73 square meters)    Stage 3A Moderate CKD (GFR = 45-59 mL/min/1.73 square meters)    Stage 3B Moderate CKD (GFR = 30-44 mL/min/1.73 square meters)    Stage 4 Severe CKD (GFR = 15-29 mL/min/1.73 square meters)    Stage 5 End Stage CKD (GFR <15 mL/min/1.73 square meters)  Note: GFR calculation is accurate only with a steady state creatinine    Lipase [[id number]]  (Normal) Collected: 03/29/25 0206    Lab Status: Final result Specimen: Blood from Arm, Left Updated: 03/29/25 0233     Lipase 24 u/L     Magnesium [[id number]]  (Normal) Collected: 03/29/25 0206    Lab Status: Final result Specimen: Blood from Arm, Left Updated: 03/29/25 0233     Magnesium 2.0 mg/dL     UA w Reflex to Microscopic w Reflex to Culture [[id number]] Collected: 03/29/25 0201    Lab Status: Final result Specimen: Urine, Clean Catch Updated: 03/29/25 0225     Color, UA Colorless     Clarity, UA Clear     Specific Gravity, UA 1.004     pH, UA 6.0     Leukocytes, UA Negative     Nitrite, UA Negative     Protein, UA Negative mg/dl      Glucose, UA Negative mg/dl      Ketones, UA Negative mg/dl      Urobilinogen, UA <2.0 mg/dl      Bilirubin, UA Negative     Occult Blood, UA Negative    POCT pregnancy, urine [[id number]]  (Normal) Collected: 03/29/25 0201    Lab Status: Final result Updated: 03/29/25 0201     EXT Preg Test, Ur Negative     Control Valid            No orders to  display       ECG 12 Lead Documentation Only    Date/Time: 3/29/2025 1:33 AM    Performed by: Dallas Ruiz PA-C  Authorized by: Dallas Ruiz PA-C    Indications / Diagnosis:  Weakness, dizziness  ECG reviewed by me, the ED Provider: yes    Patient location:  ED  Previous ECG:     Previous ECG:  Compared to current    Comparison ECG info:  02/27/2022    Similarity:  No change    Comparison to cardiac monitor: No    Interpretation:     Interpretation: normal    Quality:     Tracing quality:  Limited by artifact  Rate:     ECG rate:  69    ECG rate assessment: normal    Rhythm:     Rhythm: sinus rhythm    Ectopy:     Ectopy: none    QRS:     QRS axis:  Normal    QRS intervals:  Normal  Conduction:     Conduction: normal    ST segments:     ST segments:  Normal  T waves:     T waves: normal        ED Medication and Procedure Management   Prior to Admission Medications   Prescriptions Last Dose Informant Patient Reported? Taking?   ferrous sulfate 324 (65 Fe) mg   No No   Sig: Take 1 tablet (324 mg total) by mouth daily before breakfast   Patient not taking: Reported on 3/18/2025   omeprazole (PriLOSEC) 40 MG capsule   No No   Sig: Take 1 capsule (40 mg total) by mouth daily before breakfast   polyethylene glycol (Golytely) 4000 mL solution   No No   Sig: Take 4,000 mL by mouth once for 1 dose Take 4000 mL by mouth once for 1 dose. Use as directed   Patient not taking: Reported on 11/29/2023      Facility-Administered Medications: None     Patient's Medications   Discharge Prescriptions    No medications on file       ED SEPSIS DOCUMENTATION   Time reflects when diagnosis was documented in both MDM as applicable and the Disposition within this note       Time User Action Codes Description Comment    3/29/2025  3:51 AM Dallas Ruiz [R53.83] Fatigue     3/29/2025  3:51 AM Dallas Ruiz [D50.9] Iron deficiency anemia     3/29/2025  5:18 AM Dallas Ruiz [R71.8] Schistocytosis                  Dallas  CELE Ruiz  03/29/25 0602

## 2025-03-29 NOTE — ASSESSMENT & PLAN NOTE
Worsening fatigue for the past week  Hemoglobin 8.8, MCV 73 on admission  Note iron panel on 3/21 significant for ferritin of 2, iron 11  S/P Venofer 200 mg in the ED    Plan:  Continue Venofer outpatient

## 2025-03-29 NOTE — ASSESSMENT & PLAN NOTE
"Per pathologist, \"rare shistocytes seen on peripheral smear; unable to determine if artifact or true shistocytes\". Recommended monitoring hemoglobin and platelets with potential hematology evaluation.    Plan:  Follow LD, haptoglobin, hemolysis smear, JONI, reticulocyte count  Consider hematology consult if necessary    "

## 2025-03-29 NOTE — H&P
"H&P - Hospitalist   Name: Karine Villarreal 44 y.o. female I MRN: [id number]  Unit/Bed#: [facility] I Date of Admission: 3/29/2025   Date of Service: 3/29/2025 I Hospital Day: 0     Assessment & Plan  Schistocytes on peripheral blood smear  Per pathologist, \"rare shistocytes seen on peripheral smear; unable to determine if artifact or true shistocytes\". Recommended monitoring hemoglobin and platelets with potential hematology evaluation.    Plan:  Follow LD, haptoglobin, hemolysis smear, JONI, reticulocyte count  Consider hematology consult if necessary    Iron deficiency anemia  Worsening fatigue for the past week  Hemoglobin 8.8, MCV 73 on admission  Note iron panel on 3/21 significant for ferritin of 2, iron 11  S/P Venofer 200 mg in the ED    Plan:  Continue Venofer outpatient      VTE Pharmacologic Prophylaxis: VTE Score: 2 Low Risk (Score 0-2) - Encourage Ambulation.  Code Status: Level 1 - Full Code   Discussion with family: Updated  (mother) at bedside.    Anticipated Length of Stay: Patient will be admitted on an observation basis with an anticipated length of stay of less than 2 midnights secondary to rare schistocytes noted on peripheral smear.    History of Present Illness   Chief Complaint: \"my body is shutting down\"    Karine Villarreal is a 44 y.o. female with a PMH of iron deficiency anemia who presents with concerns of feeling like her body is shutting down. She states she has felt fatigued and lethargic for the past week.  She started taking iron supplementation yesterday which was prescribed by her PCP due to ferritin of 2.  She reports her PCP had just recommended Venofer infusions but she has not received any infusions. She denies chest pain, shortness of breath, lightheadedness, dizziness, headaches.  Denies recent travels, diet changes, easy bruising, bleeding, fever, sick contacts, family history of sickle cell disease, thalassemia or any other blood disorder to her " "knowledge.  In the ED, CBC was remarkable for hemoglobin of 8.8, MCV 73.  Received 1 infusion of Venofer while in the ED.  Reported feeling significantly better and was going to get discharged, however, pathologist reached out to ED provider stating that \"rare schistocytes seen on peripheral blood smear\".  It was recommended for patient to be admitted to ensure hemoglobin and platelets remained stable.       Review of Systems   Constitutional:  Negative for chills and fatigue.   Respiratory:  Negative for shortness of breath.    Cardiovascular:  Negative for chest pain.   Gastrointestinal:  Negative for abdominal pain, constipation, diarrhea, nausea and vomiting.   Skin:  Negative for color change and pallor.   Neurological:  Negative for dizziness, light-headedness and numbness.   Hematological:  Does not bruise/bleed easily.       Historical Information   Past Medical History:   Diagnosis Date    Epigastric abdominal pain 10/11/2023     Past Surgical History:   Procedure Laterality Date     SECTION       Social History     Tobacco Use    Smoking status: Never    Smokeless tobacco: Never   Vaping Use    Vaping status: Never Used   Substance and Sexual Activity    Alcohol use: No    Drug use: No    Sexual activity: Yes     Partners: Male     E-Cigarette/Vaping    E-Cigarette Use Never User      E-Cigarette/Vaping Substances    Nicotine No     THC No     CBD No     Flavoring No     Other No     Unknown No        Social History:  Marital Status:    Occupation: unknown     Patient Pre-hospital Living Situation: Home  Patient Pre-hospital Level of Mobility: walks  Patient Pre-hospital Diet Restrictions: none    Meds/Allergies   I have reviewed home medications with patient personally.  Prior to Admission medications    Medication Sig Start Date End Date Taking? Authorizing Provider   ferrous sulfate 324 (65 Fe) mg Take 1 tablet (324 mg total) by mouth daily before breakfast  Patient not taking: Reported " on 3/18/2025 11/30/23   Gloria Fernandez MD   omeprazole (PriLOSEC) 40 MG capsule Take 1 capsule (40 mg total) by mouth daily before breakfast 3/18/25 9/14/25  Gloria Fernandez MD   polyethylene glycol (Golytely) 4000 mL solution Take 4,000 mL by mouth once for 1 dose Take 4000 mL by mouth once for 1 dose. Use as directed  Patient not taking: Reported on 11/29/2023 10/25/23 10/25/23  Steve Betancourt MD     No Known Allergies    Objective :  Temp:  [97.8 °F (36.6 °C)] 97.8 °F (36.6 °C)  HR:  [58-72] 58  BP: (103-140)/(62-69) 103/62  Resp:  [16-18] 16  SpO2:  [100 %] 100 %  O2 Device: None (Room air)    Physical Exam  Constitutional:       General: She is not in acute distress.     Appearance: Normal appearance. She is not ill-appearing.   HENT:      Head: Normocephalic and atraumatic.      Nose: Nose normal.   Cardiovascular:      Rate and Rhythm: Normal rate and regular rhythm.      Pulses: Normal pulses.      Heart sounds: Normal heart sounds.   Pulmonary:      Effort: Pulmonary effort is normal. No respiratory distress.      Breath sounds: Normal breath sounds. No wheezing.   Abdominal:      General: Abdomen is flat. Bowel sounds are normal. There is no distension.      Palpations: Abdomen is soft.      Tenderness: There is no abdominal tenderness.   Musculoskeletal:      Right lower leg: No edema.      Left lower leg: No edema.   Skin:     General: Skin is warm.      Capillary Refill: Capillary refill takes less than 2 seconds.   Neurological:      Mental Status: She is alert and oriented to person, place, and time.        Lines/Drains:            Lab Results: I have reviewed the following results:  Results from last 7 days   Lab Units 03/29/25  0206   WBC Thousand/uL 6.66   HEMOGLOBIN g/dL 8.8*   HEMATOCRIT % 30.9*   PLATELETS Thousands/uL 327     Results from last 7 days   Lab Units 03/29/25  0206   SODIUM mmol/L 137   POTASSIUM mmol/L 4.2   CHLORIDE mmol/L 111*   CO2 mmol/L 21   BUN mg/dL 12   CREATININE mg/dL  0.65   ANION GAP mmol/L 5   CALCIUM mg/dL 8.5   ALBUMIN g/dL 3.7   TOTAL BILIRUBIN mg/dL 0.24   ALK PHOS U/L 44   ALT U/L 11   AST U/L 14   GLUCOSE RANDOM mg/dL 98             Lab Results   Component Value Date    HGBA1C 5.5 03/21/2025                 Administrative Statements       ** Please Note: This note has been constructed using a voice recognition system. **

## 2025-03-30 PROBLEM — R42 DIZZINESS: Status: ACTIVE | Noted: 2025-03-30

## 2025-03-30 PROCEDURE — 99232 SBSQ HOSP IP/OBS MODERATE 35: CPT | Performed by: INTERNAL MEDICINE

## 2025-03-30 PROCEDURE — 99222 1ST HOSP IP/OBS MODERATE 55: CPT | Performed by: INTERNAL MEDICINE

## 2025-03-30 RX ORDER — BISACODYL 5 MG/1
10 TABLET, DELAYED RELEASE ORAL ONCE
Status: COMPLETED | OUTPATIENT
Start: 2025-03-30 | End: 2025-03-30

## 2025-03-30 RX ORDER — SODIUM CHLORIDE, SODIUM LACTATE, POTASSIUM CHLORIDE, CALCIUM CHLORIDE 600; 310; 30; 20 MG/100ML; MG/100ML; MG/100ML; MG/100ML
125 INJECTION, SOLUTION INTRAVENOUS CONTINUOUS
Status: CANCELLED | OUTPATIENT
Start: 2025-03-30

## 2025-03-30 RX ADMIN — PANTOPRAZOLE SODIUM 40 MG: 40 TABLET, DELAYED RELEASE ORAL at 05:38

## 2025-03-30 RX ADMIN — POLYETHYLENE GLYCOL 3350, SODIUM SULFATE ANHYDROUS, SODIUM BICARBONATE, SODIUM CHLORIDE, POTASSIUM CHLORIDE 4000 ML: 236; 22.74; 6.74; 5.86; 2.97 POWDER, FOR SOLUTION ORAL at 17:41

## 2025-03-30 RX ADMIN — IRON SUCROSE 200 MG: 20 INJECTION, SOLUTION INTRAVENOUS at 09:30

## 2025-03-30 RX ADMIN — ACETAMINOPHEN 650 MG: 325 TABLET, FILM COATED ORAL at 05:38

## 2025-03-30 RX ADMIN — BISACODYL 10 MG: 5 TABLET, COATED ORAL at 17:41

## 2025-03-30 RX ADMIN — ACETAMINOPHEN 650 MG: 325 TABLET, FILM COATED ORAL at 15:43

## 2025-03-30 NOTE — UTILIZATION REVIEW
"Initial Clinical Review    Admission: Date/Time/Statement: 3/29/25 0518 and CHANGED 3/30/25 1423 TO INPATIENT DUE TO CONTINUED FATIGUE WITH CONCERN OF ANEMIA ON IV VENOFER, GI CONSULTED  WITH MULTIPLE LABS PENDING AS SCHISTOCYTES SEEN ON PERIPHERAL SMEAR.   Admission Orders (From admission, onward)       Ordered        03/30/25 1423  INPATIENT ADMISSION  Once            03/29/25 0518  Place in Observation  Once                          Orders Placed This Encounter   Procedures    INPATIENT ADMISSION     Standing Status:   Standing     Number of Occurrences:   1     Level of Care:   Med Surg [16]     Estimated length of stay:   More than 2 Midnights     Certification:   I certify that inpatient services are medically necessary for this patient for a duration of greater than two midnights. See H&P and MD Progress Notes for additional information about the patient's course of treatment.     ED Arrival Information       Expected   -    Arrival   3/29/2025 01:08    Acuity   Urgent              Means of arrival   Walk-In    Escorted by   Family Member    Service   Hospitalist    Admission type   Emergency              Arrival complaint   Trouble breathing             Chief Complaint   Patient presents with    Weakness - Generalized     Pt from home, states she \"feels like her body is shutting down and it is hard to explain\" Pt states she had trouble waking up and calling for her mother to come get her. Pt denies CP, +dizziness, +SOB +blurry vision in both eyes. Pt also reports foul smelling urine and increased urgency and incontinence. Urinary symptoms for a month. Pt states she has been told this past week that she has low iron.        Initial Presentation: 44 y.o. female  to ED via walk in from home.    Admitted to observation with Dx: Iron deficiency Anemia/Schistocytes on peripheral Smear.  Presented to ED with feeling like her body is shutting down - over the week prior to arrival with increased lethargy and fatigue, " "trouble waking up worse on day of arrival, blurred vision and dizziness.  Increased urinary frequency. She was recently seen by her PCP and was noted to have severe iron deficiency anemia with downtrending hemoglobin with a ferritin of 2 PMHx:  iron deficiency anemia.  On exam: alert and oriented. No focal deficits.   Wbc 6.66.  H&H 8.8/30.9. ED treatment:  IVF bolus of 1 liter.    Plan includes to start Venofer.  Trend CBC. Direct Cleo and haptoglobin levels are pending.  OP GI follow up.      Anticipated Length of Stay/Certification Statement: Patient will be admitted on an observation basis with an anticipated length of stay of less than 2 midnights secondary to rare schistocytes noted on peripheral smear.     3/30/25 CHANGED TO INPATIENT:  Continued fatigue.  On exam:  no focal deficits.   Per pathologist, \"rare shistocytes seen on peripheral smear; unable to determine if artifact or true shistocytes\". Anemia testing:     Direct Cleo test negative.  Fit test positive for occult blood.   Immature Reticulocyte count normal.  Hemolysis smear shows schistocytes..      wnl  Plan: Follow haptoglobin.   Follow-up H. pylori antigen stool.   Follow-up hemoglobin fractionation cascade.   Consider GI due to suspicion of ulcer from untreated H Pylori.   Clear liquid diet.  Npo midnight for possible EGD.  Consult hematology if no findings on EGD.  Continue IV venofer.      3/30/25 per GI - \"Iron deficiency anemia: Possibly multifactorial, patient does report menorrhagia-currently being evaluated for hemolytic anemia, given reports of dark stools, history of H. pylori, longstanding dyspepsia symptoms, differential does include peptic ulcer disease/gastritis/AVM less likely malignancy\"    recommend to check celiac serologies.  Egd and colonoscopy.  Clears today and bowel prep.  Monitor H&H .     Date:  3/31/25  Day 2:  with ambulation had some mild intermittent lightheadedness that improved with rest.   Exam non " focal.  For colonoscopy and egd today,  if non revealing then hematology consult.  Continue Venofer and PPI.     3/31/25 procedure - Colonoscopy - The terminal ileum and entire colon appeared normal.   No polyps seen by prep was fair and required extensive irrigation and   suctioning   Repeat colonoscopy in 5 years, due: 3/30/2030   Inadequate bowel preparation     3/31/25 Procedure - egd - The esophagus appeared normal.   Ring in the lower third of the esophagus   The stomach appeared normal. Performed random biopsy to rule out H.   pylori.   The duodenum appeared normal. Performed random biopsy to rule out celiac   disease.   Await pathology results   ED Treatment-Medication Administration from 03/29/2025 0108 to 03/29/2025 0630         Date/Time Order Dose Route Action     03/29/2025 0228 sodium chloride 0.9 % bolus 1,000 mL 1,000 mL Intravenous New Bag     03/29/2025 0353 iron sucrose (VENOFER) 200 mg in sodium chloride 0.9 % 100 mL IVPB 200 mg Intravenous New Bag            Scheduled Medications:  iron sucrose, 200 mg, Intravenous, Once given 0353 on 3/29,  0930 on 3/30  pantoprazole, 40 mg, Oral, Early Morning    iron sucrose (VENOFER) 200 mg in sodium chloride 0.9 % 100 mL IVPB  Dose: 200 mg  Freq: Daily Route: IV  Start: 03/31/25 0900 End: 04/02/25 0859    bisacodyl (DULCOLAX) EC tablet 10 mg  Dose: 10 mg  Freq: Once Route: PO  Start: 03/30/25 1730 End: 03/30/25 1741  polyethylene glycol (GOLYTELY) bowel prep 4,000 mL  Dose: 4,000 mL  Freq: Once Route: PO  Start: 03/30/25 1830 End: 03/30/25 1741    Continuous IV Infusions:     PRN Meds:  acetaminophen, 650 mg, Oral, Q6H PRN x 2 3/30    ED Triage Vitals   Temperature Pulse Respirations Blood Pressure SpO2 Pain Score   03/29/25 0121 03/29/25 0118 03/29/25 0118 03/29/25 0118 03/29/25 0118 03/29/25 0800   97.8 °F (36.6 °C) 72 18 140/69 100 % No Pain     Weight (last 2 days)       None          Vital Signs (last 3 days)       Date/Time Temp Pulse Resp BP MAP  (mmHg) SpO2 O2 Device Patient Position - Orthostatic VS [place] Coma Scale Score Pain    03/31/25 1300 -- -- -- -- -- -- None (Room air) -- 14 No Pain    03/31/25 11:58:17 97.9 °F (36.6 °C) 60 -- 109/65 80 98 % -- -- -- --    03/31/25 1129 -- 67 16 94/53 -- 100 % None (Room air) -- -- --    03/31/25 1119 -- 77 17 96/54 -- 100 % None (Room air) -- -- --    03/31/25 1109 97 °F (36.1 °C) 65 15 110/58 -- 100 % None (Room air) -- -- No Pain    03/31/25 0936 97.2 °F (36.2 °C) 75 18 112/56 -- 100 % None (Room air) -- -- --    03/31/25 07:44:06 97.5 °F (36.4 °C) 67 22 104/61 75 99 % -- -- -- --    03/30/25 2227 99 °F (37.2 °C) -- 18 102/60 -- -- -- -- -- --    03/30/25 2000 -- -- -- -- -- -- -- -- 15 No Pain    03/30/25 1543 -- -- -- -- -- -- -- -- -- 5    03/30/25 15:37:45 99.3 °F (37.4 °C) 66 -- 99/61 74 100 % -- -- -- --    03/30/25 1500 -- -- -- -- -- -- -- -- -- No Pain    03/30/25 0915 -- -- -- -- -- -- None (Room air) -- 15 --    03/30/25 06:58:34 98.1 °F (36.7 °C) 66 20 112/64 80 100 % -- -- -- --    03/30/25 0538 -- -- -- -- -- -- -- -- -- 5    03/30/25 0048 -- -- -- -- -- -- -- -- 15 No Pain    03/29/25 2215 98.1 °F (36.7 °C) -- 16 112/64 -- -- -- -- -- --    03/29/25 1500 -- -- -- -- -- -- None (Room air) -- 15 No Pain    03/29/25 1437 98 °F (36.7 °C) 78 18 118/66 83 100 % -- -- -- --    03/29/25 0800 -- -- -- -- -- -- -- -- 15 No Pain    03/29/25 0631 98.1 °F (36.7 °C) 65 16 115/68 87 100 % None (Room air) Lying -- --    03/29/25 0459 -- 58 16 103/62 -- 100 % None (Room air) Sitting -- --    03/29/25 0353 -- 61 16 112/69 85 100 % None (Room air) Sitting -- --    03/29/25 0232 -- -- -- -- -- -- -- -- 15 --    03/29/25 0130 -- 66 18 110/69 85 100 % None (Room air) -- -- --    03/29/25 0121 97.8 °F (36.6 °C) -- -- -- -- -- -- -- -- --    03/29/25 0118 -- 72 18 140/69 97 100 % None (Room air) Sitting -- --          Pertinent Labs/Diagnostic Test Results:   Radiology:  No orders to display     Cardiology:  No orders  to display     ECG 12 Lead Documentation Only   Date/Time: 3/29/2025 1:33 AM  Indications / Diagnosis:  Weakness, dizziness  ECG reviewed by  the ED Provider: yes    Patient location:  ED  Previous ECG:     Previous ECG:  Compared to current    Comparison ECG info:  02/27/2022    Similarity:  No change    Comparison to cardiac monitor: No    Interpretation:     Interpretation: normal    Quality:     Tracing quality:  Limited by artifact  Rate:     ECG rate:  69    ECG rate assessment: normal    Rhythm:     Rhythm: sinus rhythm    Ectopy:     Ectopy: none    QRS:     QRS axis:  Normal    QRS intervals:  Normal  Conduction:     Conduction: normal    ST segments:     ST segments:  Normal  T waves:     T waves: normal      GI:  Colonoscopy   Final Result by Dana Alcala MD (03/31 1122)   The terminal ileum and entire colon appeared normal.            RECOMMENDATION:   Repeat colonoscopy in 5 years, due: 3/30/2030   Inadequate bowel preparation       No polyps seen by prep was fair and required extensive irrigation and    suctioning.    Recommend 2d prep.                         Dana Alcala MD          EGD   Final Result by Dana Alcala MD (03/31 1036)   The esophagus appeared normal.   Ring in the lower third of the esophagus   The stomach appeared normal. Performed random biopsy to rule out H.    pylori.   The duodenum appeared normal. Performed random biopsy to rule out celiac    disease.         RECOMMENDATION:   Await pathology results    Proceed with colonoscopy                     Dana Alcala MD                03/29/2025 0611 03/29/2025 1321 Direct antiglobulin test [[id number]]   Blood from Arm, Right    Final result Component Value   DIRECT YAN Negative                     03/29/2025 1120 03/29/2025 1140 iFOBT (FIT) [[id number]]    (Abnormal)   Stool from Per Rectum    Final result Component Value   OCCULT BLD, FECAL IMMUNOLOGICAL Positive Abnormal         03/29/2025 0206 03/29/2025 0954 Retic Count with  Reticulocyte HGB [[id number]]   (Abnormal)   Blood from Arm, Left    Final result Component Value Units   Immature Retic Fract 28.2 High  %   Retic Ct Pct 1.53 %   Retic Ct Abs 64,900    RETIC HGB 21.1 Low  pg        Results from last 7 days   Lab Units 03/29/25  0136   SARS-COV-2  Negative     Results from last 7 days   Lab Units 03/31/25  0548 03/29/25  0206   WBC Thousand/uL 7.40 6.66   HEMOGLOBIN g/dL 9.6* 8.8*   HEMATOCRIT % 34.4* 30.9*   PLATELETS Thousands/uL 382 327     03/29/2025 0611 03/29/2025 0731 Hemolysis Smear [[id number]]   Arm, Right    Final result Component Value   Hemolysis Smear Schistocytes noted        03/29/2025 0206 03/29/2025 0641 LD,Blood [[id number]]   Blood from Arm, Left    Final result Component Value Units            Results from last 7 days   Lab Units 03/29/25  0206   RETIC CT ABS  64,900   RETIC CT PCT % 1.53     Results from last 7 days   Lab Units 03/31/25  0548 03/29/25  0206   SODIUM mmol/L 138 137   POTASSIUM mmol/L 3.9 4.2   CHLORIDE mmol/L 108 111*   CO2 mmol/L 23 21   ANION GAP mmol/L 7 5   BUN mg/dL 8 12   CREATININE mg/dL 0.67 0.65   EGFR ml/min/1.73sq m 108 109   CALCIUM mg/dL 8.3* 8.5   MAGNESIUM mg/dL  --  2.0     Results from last 7 days   Lab Units 03/29/25  0206   AST U/L 14   ALT U/L 11   ALK PHOS U/L 44   TOTAL PROTEIN g/dL 6.6   ALBUMIN g/dL 3.7   TOTAL BILIRUBIN mg/dL 0.24     Results from last 7 days   Lab Units 03/31/25  0548 03/29/25  0206   GLUCOSE RANDOM mg/dL 86 98     Results from last 7 days   Lab Units 03/29/25  0206   TSH 3RD GENERATON uIU/mL 3.200     Results from last 7 days   Lab Units 03/29/25  0206   LIPASE u/L 24     Results from last 7 days   Lab Units 03/29/25  0201   CLARITY UA  Clear   COLOR UA  Colorless   SPEC GRAV UA  1.004   PH UA  6.0   GLUCOSE UA mg/dl Negative   KETONES UA mg/dl Negative   BLOOD UA  Negative   PROTEIN UA mg/dl Negative   NITRITE UA  Negative   BILIRUBIN UA  Negative   UROBILINOGEN UA (BE) mg/dl <2.0   LEUKOCYTES UA   Negative     Results from last 7 days   Lab Units 03/29/25  0136   INFLUENZA A PCR  Negative   INFLUENZA B PCR  Negative   RSV PCR  Negative     Past Medical History:   Diagnosis Date    Epigastric abdominal pain 10/11/2023     Present on Admission:   Iron deficiency anemia      Admitting Diagnosis: Iron deficiency anemia [D50.9]  Fatigue [R53.83]  Weakness [R53.1]  Schistocytosis [R71.8]  Age/Sex: 44 y.o. female    Network Utilization Review Department  ATTENTION: Please call with any questions or concerns to [phone] and carefully listen to the prompts so that you are directed to the right person. All voicemails are confidential.   For Discharge needs, contact Care Management DC Support Team at [phone] opt. [phone]  Send all requests for admission clinical reviews, approved or denied determinations and any other requests to dedicated fax number below belonging to the campus where the patient is receiving treatment. List of dedicated fax numbers for the Facilities:  FACILITY NAME UR FAX NUMBER   ADMISSION DENIALS (Administrative/Medical Necessity) [phone]   DISCHARGE SUPPORT TEAM (NETWORK) [phone]   PARENT CHILD HEALTH (Maternity/NICU/Pediatrics) [phone]   [facility] [phone]   [facility] [phone]   [facility] [phone]   [facility] [phone]   [facility] [phone]   [facility] [phone]   [facility] [phone]   [facility] [phone]   [facility] [phone]   [facility] [phone]   [facility] [phone]   [facility] [phone]

## 2025-03-30 NOTE — ASSESSMENT & PLAN NOTE
Patient with iron deficiency anemia with worsening fatigue dizziness lightheadedness over the past week and recently was started on oral iron, patient does admit to dark stools, patient did have schistocytes on peripheral smear and workup for hemolytic anemia has been ordered but does have significant iron deficiency with ferritin of 2  -Would recommend EGD and colonoscopy for further evaluation, will plan for tomorrow, prep and procedure were explained to the patient  -Clear liquid diet today then n.p.o. after midnight  -Can consider hematology consult pending her course  -Venofer ordered as per primary team  -Can continue PPI

## 2025-03-30 NOTE — CONSULTS
Consultation - Gastroenterology   Name: Karine Villarreal 44 y.o. female I MRN: [id number]  Unit/Bed#: W -01 I Date of Admission: 3/29/2025   Date of Service: 3/30/2025 I Hospital Day: 0   Inpatient consult to gastroenterology  Consult performed by: Ai Bolanos PA-C  Consult ordered by: Alexa Durán MD        Physician Requesting Evaluation: Marcelo Slaughter MD   Reason for Evaluation / Principal Problem: anemia      Assessment & Plan  Iron deficiency anemia  Patient with iron deficiency anemia with worsening fatigue dizziness lightheadedness over the past week and recently was started on oral iron, patient does admit to dark stools, patient did have schistocytes on peripheral smear and workup for hemolytic anemia has been ordered but does have significant iron deficiency with ferritin of 2  -Would recommend EGD and colonoscopy for further evaluation, will plan for tomorrow, prep and procedure were explained to the patient  -Clear liquid diet today then n.p.o. after midnight  -Can consider hematology consult pending her course  -Venofer ordered as per primary team  -Can continue PPI  Dizziness  Likely secondary to symptomatic anemia, hemoglobin currently 8.8 with microcytic indices        History of Present Illness   HPI:  Karine Villarreal is a 44 y.o. female who presents with a PMH of iron deficiency anemia who presents with concerns of feeling like her body is shutting down. She states she has felt fatigued and lethargic for the past week.  She started taking iron supplementation which was prescribed by her PCP due to ferritin of 2.  She reports her PCP had just recommended Venofer infusions but she has not received any infusions.  Patient was seen previously and was recommended to have an EGD and colonoscopy but she never had this done and that was back in 2023.  Patient otherwise reports that she had H. pylori but she does not think she treated this adequately.  She did have stool ordered for  repeat H. pylori which has not been done and was ordered as an outpatient.  She does complain of abdominal pain and significant bloating.  Denies any significant NSAID use denies any family history of gastrointestinal malignancy.  Patient reports she never had upper endoscopy or colonoscopy and does report that she has a menstrual cycle every 21 days.  Stool for occult blood was ordered on admission which was positive.  Patient does admit to dark stools but denies any dark stools in the recent past.  Denies any bright red blood per rectum.    Review of Systems  Medical History Review: I have reviewed the patient's PMH, PSH, Social History, Family History, Meds, and Allergies     Objective :  Temp:  [98.1 °F (36.7 °C)] 98.1 °F (36.7 °C)  HR:  [66] 66  BP: (112)/(64) 112/64  Resp:  [16-20] 20  SpO2:  [100 %] 100 %  O2 Device: None (Room air)    Physical Exam  Constitutional:       General: She is not in acute distress.     Appearance: She is well-developed.   HENT:      Head: Normocephalic and atraumatic.   Eyes:      Conjunctiva/sclera: Conjunctivae normal.   Cardiovascular:      Rate and Rhythm: Normal rate and regular rhythm.      Heart sounds: No murmur heard.  Pulmonary:      Effort: Pulmonary effort is normal. No respiratory distress.      Breath sounds: Normal breath sounds. No wheezing, rhonchi or rales.   Abdominal:      Palpations: Abdomen is soft.      Tenderness: There is no abdominal tenderness.   Musculoskeletal:         General: No swelling.      Cervical back: Neck supple.   Skin:     General: Skin is warm and dry.      Capillary Refill: Capillary refill takes less than 2 seconds.      Coloration: Skin is not cyanotic, jaundiced or pale.   Neurological:      Mental Status: She is alert.   Psychiatric:         Mood and Affect: Mood normal.       Lab Results: I have reviewed the following results:

## 2025-03-30 NOTE — ASSESSMENT & PLAN NOTE
"Per pathologist, \"rare shistocytes seen on peripheral smear; unable to determine if artifact or true shistocytes\". Recommended monitoring hemoglobin and platelets with potential hematology evaluation.  Anemia testing  Direct Cleo test negative  Fit test positive for occult blood  Immature Reticulocyte count normal  Hemolysis smear shows schistocytes.   wnl    Plan:  Follow haptoglobin  Follow-up H. pylori antigen stool  Follow-up hemoglobin fractionation cascade  Consider GI due to suspicion of ulcer from untreated H Pylori.   Clear liquid diet  Npo midnight for possible EGD  Consult hematology if no findings on EGD    "

## 2025-03-30 NOTE — PLAN OF CARE
Problem: Potential for Falls  Goal: Patient will remain free of falls  Description: INTERVENTIONS:- Educate patient/family on patient safety including physical limitations- Instruct patient to call for assistance with activity - Consult OT/PT to assist with strengthening/mobility - Keep Call bell within reach- Keep bed low and locked with side rails adjusted as appropriate- Keep care items and personal belongings within reach- Initiate and maintain comfort rounds- Make Fall Risk Sign visible to staff- Offer Toileting every  Hours, in advance of need- Initiate/Maintain alarm- Obtain necessary fall risk management equipment: - Apply yellow socks and bracelet for high fall risk patients- Consider moving patient to room near nurses station  INTERVENTIONS:- Educate patient/family on patient safety including physical limitations- Instruct patient to call for assistance with activity - Consult OT/PT to assist with strengthening/mobility - Keep Call bell within reach- Keep bed low and locked with side rails adjusted as appropriate- Keep care items and personal belongings within reach- Initiate and maintain comfort rounds- Make Fall Risk Sign visible to staff- Offer Toileting every  Hours, in advance of need- Initiate/Maintain alarm- Obtain necessary fall risk management equipment: - Apply yellow socks and bracelet for high fall risk patients- Consider moving patient to room near nurses station  Outcome: Progressing     Problem: PAIN - ADULT  Goal: Verbalizes/displays adequate comfort level or baseline comfort level  Description: Interventions:- Encourage patient to monitor pain and request assistance- Assess pain using appropriate pain scale- Administer analgesics based on type and severity of pain and evaluate response- Implement non-pharmacological measures as appropriate and evaluate response- Consider cultural and social influences on pain and pain management- Notify physician/advanced practitioner if interventions  unsuccessful or patient reports new pain  Outcome: Progressing     Problem: INFECTION - ADULT  Goal: Absence or prevention of progression during hospitalization  Description: INTERVENTIONS:- Assess and monitor for signs and symptoms of infection- Monitor lab/diagnostic results- Monitor all insertion sites, i.e. indwelling lines, tubes, and drains- Monitor endotracheal if appropriate and nasal secretions for changes in amount and color- [place] appropriate cooling/warming therapies per order- Administer medications as ordered- Instruct and encourage patient and family to use good hand hygiene technique- Identify and instruct in appropriate isolation precautions for identified infection/condition  Outcome: Progressing  Goal: Absence of fever/infection during neutropenic period  Description: INTERVENTIONS:- Monitor WBC  Outcome: Progressing     Problem: SAFETY ADULT  Goal: Patient will remain free of falls  Description: INTERVENTIONS:- Educate patient/family on patient safety including physical limitations- Instruct patient to call for assistance with activity - Consult OT/PT to assist with strengthening/mobility - Keep Call bell within reach- Keep bed low and locked with side rails adjusted as appropriate- Keep care items and personal belongings within reach- Initiate and maintain comfort rounds- Make Fall Risk Sign visible to staff- Offer Toileting every  Hours, in advance of need- Initiate/Maintain alarm- Obtain necessary fall risk management equipment: - Apply yellow socks and bracelet for high fall risk patients- Consider moving patient to room near nurses station  INTERVENTIONS:- Educate patient/family on patient safety including physical limitations- Instruct patient to call for assistance with activity - Consult OT/PT to assist with strengthening/mobility - Keep Call bell within reach- Keep bed low and locked with side rails adjusted as appropriate- Keep care items and personal belongings within reach-  Initiate and maintain comfort rounds- Make Fall Risk Sign visible to staff- Offer Toileting every  Hours, in advance of need- Initiate/Maintain alarm- Obtain necessary fall risk management equipment: - Apply yellow socks and bracelet for high fall risk patients- Consider moving patient to room near nurses station  Outcome: Progressing  Goal: Maintain or return to baseline ADL function  Description: INTERVENTIONS:-  Assess patient's ability to carry out ADLs; assess patient's baseline for ADL function and identify physical deficits which impact ability to perform ADLs (bathing, care of mouth/teeth, toileting, grooming, dressing, etc.)- Assess/evaluate cause of self-care deficits - Assess range of motion- Assess patient's mobility; develop plan if impaired- Assess patient's need for assistive devices and provide as appropriate- Encourage maximum independence but intervene and supervise when necessary- Involve family in performance of ADLs- Assess for home care needs following discharge - Consider OT consult to assist with ADL evaluation and planning for discharge- Provide patient education as appropriate  Outcome: Progressing  Goal: Maintains/Returns to pre admission functional level  Description: INTERVENTIONS:- Perform AM-PAC 6 Click Basic Mobility/ Daily Activity assessment daily.- Set and communicate daily mobility goal to care team and patient/family/caregiver. - Collaborate with rehabilitation services on mobility goals if consulted- Perform Range of Motion  times a day.- Reposition patient every  hours.- Dangle patient  times a day- Stand patient  times a day- Ambulate patient  times a day- Out of bed to chair  times a day - Out of bed for meals times a day- Out of bed for toileting- Record patient progress and toleration of activity level   Outcome: Progressing     Problem: DISCHARGE PLANNING  Goal: Discharge to home or other facility with appropriate resources  Description: INTERVENTIONS:- Identify barriers  to discharge w/patient and caregiver- Arrange for needed discharge resources and transportation as appropriate- Identify discharge learning needs (meds, wound care, etc.)- Arrange for interpretive services to assist at discharge as needed- Refer to Case Management Department for coordinating discharge planning if the patient needs post-hospital services based on physician/advanced practitioner order or complex needs related to functional status, cognitive ability, or social support system  Outcome: Progressing     Problem: Knowledge Deficit  Goal: Patient/family/caregiver demonstrates understanding of disease process, treatment plan, medications, and discharge instructions  Description: Complete learning assessment and assess knowledge base.Interventions:- Provide teaching at level of understanding- Provide teaching via preferred learning methods  Outcome: Progressing

## 2025-03-30 NOTE — PROGRESS NOTES
"Progress Note - Hospitalist   Name: Karine Villarreal 44 y.o. female I MRN: [id number]  Unit/Bed#: W -01 I Date of Admission: 3/29/2025   Date of Service: 3/30/2025 I Hospital Day: 0    Assessment & Plan  Schistocytes on peripheral blood smear  Per pathologist, \"rare shistocytes seen on peripheral smear; unable to determine if artifact or true shistocytes\". Recommended monitoring hemoglobin and platelets with potential hematology evaluation.  Anemia testing  Direct Cleo test negative  Fit test positive for occult blood  Immature Reticulocyte count normal  Hemolysis smear shows schistocytes.   wnl    Plan:  Follow haptoglobin  Follow-up H. pylori antigen stool  Follow-up hemoglobin fractionation cascade  Consider GI due to suspicion of ulcer from untreated H Pylori.   Clear liquid diet  Npo midnight for possible EGD  Consult hematology if no findings on EGD    Iron deficiency anemia  Worsening fatigue for the past week  Patient was placed on iron tabs and vitamin C but has been taking them inconsistently.  Notes possibly intermittent dark stools.  Other risk factors for anemia including heavy periods, positive celiac antibody test, positive for H. pylori via stool antigen in October 2023 (did not complete treatment and no follow-up recheck.  Patient).   PTA patient has been anemic as far back as 2018.  No prior EGD or colonoscopy in chart.  Hemoglobin 8.8, MCV 73 on admission  Note iron panel on 3/21 significant for ferritin of 2, iron 11  S/P Venofer 200 mg in the ED x1 dose    Plan:  Continue Venofer 200 mg for 2 more doses    VTE Pharmacologic Prophylaxis: VTE Score: 2 Low Risk (Score 0-2) - Encourage Ambulation.    Mobility:   Basic Mobility Inpatient Raw Score: 24  JH-HLM Goal: 8: Walk 250 feet or more  JH-HLM Achieved: 8: Walk 250 feet ot more  JH-HLM Goal achieved. Continue to encourage appropriate mobility.    Patient Centered Rounds: I performed bedside rounds with nursing staff today. "   Discussions with Specialists or Other Care Team Provider: Attending    Education and Discussions with Family / Patient: Will talk to family    Current Length of Stay: 0 day(s)  Current Patient Status: Observation   Certification Statement: The patient will continue to require additional inpatient hospital stay due to evaluation of hemolysis  Discharge Plan: Anticipate discharge tomorrow to home.    Code Status: Level 1 - Full Code    Subjective   Patient evaluated bedside.  She endorses no new complaints at this time. Denies urinary symptoms    Objective :  Temp:  [98 °F (36.7 °C)-98.1 °F (36.7 °C)] 98.1 °F (36.7 °C)  HR:  [66-78] 66  BP: (112-118)/(64-66) 112/64  Resp:  [16-20] 20  SpO2:  [100 %] 100 %  O2 Device: None (Room air)    There is no height or weight on file to calculate BMI.     Input and Output Summary (last 24 hours):     Intake/Output Summary (Last 24 hours) at 3/30/2025 0913  Last data filed at 3/30/2025 0845  Gross per 24 hour   Intake 1100 ml   Output --   Net 1100 ml       Physical Exam  Vitals and nursing note reviewed.   Constitutional:       General: She is not in acute distress.     Appearance: She is well-developed.   HENT:      Head: Normocephalic and atraumatic.   Eyes:      Conjunctiva/sclera: Conjunctivae normal.   Cardiovascular:      Rate and Rhythm: Normal rate and regular rhythm.      Heart sounds: No murmur heard.  Pulmonary:      Effort: Pulmonary effort is normal. No respiratory distress.      Breath sounds: Normal breath sounds. No wheezing, rhonchi or rales.   Abdominal:      Palpations: Abdomen is soft.      Tenderness: There is no abdominal tenderness.   Musculoskeletal:         General: No swelling.      Cervical back: Neck supple.   Skin:     General: Skin is warm and dry.      Capillary Refill: Capillary refill takes less than 2 seconds.      Coloration: Skin is not cyanotic, jaundiced or pale.   Neurological:      Mental Status: She is alert.   Psychiatric:         Mood  and Affect: Mood normal.           Lines/Drains:              Lab Results: I have reviewed the following results:   Results from last 7 days   Lab Units 03/29/25  0206   WBC Thousand/uL 6.66   HEMOGLOBIN g/dL 8.8*   HEMATOCRIT % 30.9*   PLATELETS Thousands/uL 327   LYMPHO PCT % 39   MONO PCT % 9   EOS PCT % 2     Results from last 7 days   Lab Units 03/29/25  0206   SODIUM mmol/L 137   POTASSIUM mmol/L 4.2   CHLORIDE mmol/L 111*   CO2 mmol/L 21   BUN mg/dL 12   CREATININE mg/dL 0.65   ANION GAP mmol/L 5   CALCIUM mg/dL 8.5   ALBUMIN g/dL 3.7   TOTAL BILIRUBIN mg/dL 0.24   ALK PHOS U/L 44   ALT U/L 11   AST U/L 14   GLUCOSE RANDOM mg/dL 98                       Recent Cultures (last 7 days):         Imaging Results Review: No pertinent imaging studies reviewed.  Other Study Results Review: EKG was reviewed.     Last 24 Hours Medication List:     Current Facility-Administered Medications:     acetaminophen (TYLENOL) tablet 650 mg, Q6H PRN    iron sucrose (VENOFER) 200 mg in sodium chloride 0.9 % 100 mL IVPB, Once    pantoprazole (PROTONIX) EC tablet 40 mg, Early Morning    Nutrition Assessment and Intervention:     Reviewed food recall journal      Physical Activity Assessment and Intervention:    Activity journal reviewed      Emotional and Mental Well-being, Sleep, Connectedness Assessment and Intervention:    Sleep/stress assessment performed      Tobacco and Toxic Substance Assessment and Intervention:     Tobacco use screening performed    Alcohol and drug use screening performed          Administrative Statements   Today, Patient Was Seen By: Alexa Durán MD      **Please Note: This note may have been constructed using a voice recognition system.**

## 2025-03-30 NOTE — PLAN OF CARE
Problem: PAIN - ADULT  Goal: Verbalizes/displays adequate comfort level or baseline comfort level  Description: Interventions:- Encourage patient to monitor pain and request assistance- Assess pain using appropriate pain scale- Administer analgesics based on type and severity of pain and evaluate response- Implement non-pharmacological measures as appropriate and evaluate response- Consider cultural and social influences on pain and pain management- Notify physician/advanced practitioner if interventions unsuccessful or patient reports new pain  Outcome: Progressing     Problem: INFECTION - ADULT  Goal: Absence or prevention of progression during hospitalization  Description: INTERVENTIONS:- Assess and monitor for signs and symptoms of infection- Monitor lab/diagnostic results- Monitor all insertion sites, i.e. indwelling lines, tubes, and drains- Monitor endotracheal if appropriate and nasal secretions for changes in amount and color- [place] appropriate cooling/warming therapies per order- Administer medications as ordered- Instruct and encourage patient and family to use good hand hygiene technique- Identify and instruct in appropriate isolation precautions for identified infection/condition  Outcome: Progressing     Problem: Knowledge Deficit  Goal: Patient/family/caregiver demonstrates understanding of disease process, treatment plan, medications, and discharge instructions  Description: Complete learning assessment and assess knowledge base.Interventions:- Provide teaching at level of understanding- Provide teaching via preferred learning methods  Outcome: Progressing

## 2025-03-30 NOTE — ASSESSMENT & PLAN NOTE
Worsening fatigue for the past week  Patient was placed on iron tabs and vitamin C but has been taking them inconsistently.  Notes possibly intermittent dark stools.  Other risk factors for anemia including heavy periods, positive celiac antibody test, positive for H. pylori via stool antigen in October 2023 (did not complete treatment and no follow-up recheck.  Patient).   PTA patient has been anemic as far back as 2018.  No prior EGD or colonoscopy in chart.  Hemoglobin 8.8, MCV 73 on admission  Note iron panel on 3/21 significant for ferritin of 2, iron 11  S/P Venofer 200 mg in the ED x1 dose    Plan:  Continue Venofer 200 mg for 2 more doses

## 2025-03-31 ENCOUNTER — ANESTHESIA EVENT (INPATIENT)
Dept: GASTROENTEROLOGY | Facility: HOSPITAL | Age: 45
End: 2025-03-31
Payer: COMMERCIAL

## 2025-03-31 ENCOUNTER — ANESTHESIA (INPATIENT)
Dept: GASTROENTEROLOGY | Facility: HOSPITAL | Age: 45
End: 2025-03-31
Payer: COMMERCIAL

## 2025-03-31 ENCOUNTER — APPOINTMENT (INPATIENT)
Dept: GASTROENTEROLOGY | Facility: HOSPITAL | Age: 45
End: 2025-03-31
Payer: COMMERCIAL

## 2025-03-31 VITALS
SYSTOLIC BLOOD PRESSURE: 109 MMHG | RESPIRATION RATE: 16 BRPM | HEART RATE: 60 BPM | DIASTOLIC BLOOD PRESSURE: 65 MMHG | TEMPERATURE: 97.9 F | OXYGEN SATURATION: 98 %

## 2025-03-31 LAB
ANION GAP SERPL CALCULATED.3IONS-SCNC: 7 MMOL/L (ref 4–13)
BUN SERPL-MCNC: 8 MG/DL (ref 5–25)
CALCIUM SERPL-MCNC: 8.3 MG/DL (ref 8.4–10.2)
CHLORIDE SERPL-SCNC: 108 MMOL/L (ref 96–108)
CO2 SERPL-SCNC: 23 MMOL/L (ref 21–32)
CREAT SERPL-MCNC: 0.67 MG/DL (ref 0.6–1.3)
ERYTHROCYTE [DISTWIDTH] IN BLOOD BY AUTOMATED COUNT: 19.2 % (ref 11.6–15.1)
GFR SERPL CREATININE-BSD FRML MDRD: 108 ML/MIN/1.73SQ M
GLUCOSE SERPL-MCNC: 86 MG/DL (ref 65–140)
H PYLORI AG STL QL IA: NEGATIVE
HAPTOGLOB SERPL-MCNC: 111 MG/DL (ref 42–296)
HCT VFR BLD AUTO: 34.4 % (ref 34.8–46.1)
HGB BLD-MCNC: 9.6 G/DL (ref 11.5–15.4)
IGA SERPL-MCNC: 284 MG/DL (ref 66–433)
MCH RBC QN AUTO: 20.6 PG (ref 26.8–34.3)
MCHC RBC AUTO-ENTMCNC: 27.9 G/DL (ref 31.4–37.4)
MCV RBC AUTO: 74 FL (ref 82–98)
PLATELET # BLD AUTO: 382 THOUSANDS/UL (ref 149–390)
PMV BLD AUTO: 12.6 FL (ref 8.9–12.7)
POTASSIUM SERPL-SCNC: 3.9 MMOL/L (ref 3.5–5.3)
RBC # BLD AUTO: 4.66 MILLION/UL (ref 3.81–5.12)
SODIUM SERPL-SCNC: 138 MMOL/L (ref 135–147)
WBC # BLD AUTO: 7.4 THOUSAND/UL (ref 4.31–10.16)

## 2025-03-31 PROCEDURE — 88305 TISSUE EXAM BY PATHOLOGIST: CPT | Performed by: STUDENT IN AN ORGANIZED HEALTH CARE EDUCATION/TRAINING PROGRAM

## 2025-03-31 PROCEDURE — 82784 ASSAY IGA/IGD/IGG/IGM EACH: CPT

## 2025-03-31 PROCEDURE — 0DB98ZX EXCISION OF DUODENUM, VIA NATURAL OR ARTIFICIAL OPENING ENDOSCOPIC, DIAGNOSTIC: ICD-10-PCS | Performed by: STUDENT IN AN ORGANIZED HEALTH CARE EDUCATION/TRAINING PROGRAM

## 2025-03-31 PROCEDURE — 0DJD8ZZ INSPECTION OF LOWER INTESTINAL TRACT, VIA NATURAL OR ARTIFICIAL OPENING ENDOSCOPIC: ICD-10-PCS | Performed by: STUDENT IN AN ORGANIZED HEALTH CARE EDUCATION/TRAINING PROGRAM

## 2025-03-31 PROCEDURE — 99239 HOSP IP/OBS DSCHRG MGMT >30: CPT | Performed by: INTERNAL MEDICINE

## 2025-03-31 PROCEDURE — 45378 DIAGNOSTIC COLONOSCOPY: CPT | Performed by: STUDENT IN AN ORGANIZED HEALTH CARE EDUCATION/TRAINING PROGRAM

## 2025-03-31 PROCEDURE — 85060 BLOOD SMEAR INTERPRETATION: CPT | Performed by: PATHOLOGY

## 2025-03-31 PROCEDURE — 0DB68ZX EXCISION OF STOMACH, VIA NATURAL OR ARTIFICIAL OPENING ENDOSCOPIC, DIAGNOSTIC: ICD-10-PCS | Performed by: STUDENT IN AN ORGANIZED HEALTH CARE EDUCATION/TRAINING PROGRAM

## 2025-03-31 PROCEDURE — 86364 TISS TRNSGLTMNASE EA IG CLAS: CPT

## 2025-03-31 PROCEDURE — 80048 BASIC METABOLIC PNL TOTAL CA: CPT

## 2025-03-31 PROCEDURE — 43239 EGD BIOPSY SINGLE/MULTIPLE: CPT | Performed by: STUDENT IN AN ORGANIZED HEALTH CARE EDUCATION/TRAINING PROGRAM

## 2025-03-31 PROCEDURE — 85027 COMPLETE CBC AUTOMATED: CPT

## 2025-03-31 RX ORDER — LIDOCAINE HYDROCHLORIDE 20 MG/ML
INJECTION, SOLUTION EPIDURAL; INFILTRATION; INTRACAUDAL; PERINEURAL AS NEEDED
Status: DISCONTINUED | OUTPATIENT
Start: 2025-03-31 | End: 2025-03-31

## 2025-03-31 RX ORDER — PROPOFOL 10 MG/ML
INJECTION, EMULSION INTRAVENOUS AS NEEDED
Status: DISCONTINUED | OUTPATIENT
Start: 2025-03-31 | End: 2025-03-31

## 2025-03-31 RX ORDER — FENTANYL CITRATE 50 UG/ML
INJECTION, SOLUTION INTRAMUSCULAR; INTRAVENOUS AS NEEDED
Status: DISCONTINUED | OUTPATIENT
Start: 2025-03-31 | End: 2025-03-31

## 2025-03-31 RX ORDER — FERROUS SULFATE 324(65)MG
324 TABLET, DELAYED RELEASE (ENTERIC COATED) ORAL EVERY OTHER DAY
Qty: 90 TABLET | Refills: 0 | Status: SHIPPED | OUTPATIENT
Start: 2025-03-31

## 2025-03-31 RX ORDER — SODIUM CHLORIDE, SODIUM LACTATE, POTASSIUM CHLORIDE, CALCIUM CHLORIDE 600; 310; 30; 20 MG/100ML; MG/100ML; MG/100ML; MG/100ML
INJECTION, SOLUTION INTRAVENOUS CONTINUOUS PRN
Status: DISCONTINUED | OUTPATIENT
Start: 2025-03-31 | End: 2025-03-31

## 2025-03-31 RX ORDER — PHENYLEPHRINE HCL IN 0.9% NACL 1 MG/10 ML
SYRINGE (ML) INTRAVENOUS AS NEEDED
Status: DISCONTINUED | OUTPATIENT
Start: 2025-03-31 | End: 2025-03-31

## 2025-03-31 RX ADMIN — Medication 100 MCG: at 10:45

## 2025-03-31 RX ADMIN — PROPOFOL 90 MG: 10 INJECTION, EMULSION INTRAVENOUS at 10:28

## 2025-03-31 RX ADMIN — PROPOFOL 20 MG: 10 INJECTION, EMULSION INTRAVENOUS at 10:31

## 2025-03-31 RX ADMIN — Medication 100 MCG: at 10:41

## 2025-03-31 RX ADMIN — FENTANYL CITRATE 50 MCG: 50 INJECTION INTRAMUSCULAR; INTRAVENOUS at 10:26

## 2025-03-31 RX ADMIN — FENTANYL CITRATE 50 MCG: 50 INJECTION INTRAMUSCULAR; INTRAVENOUS at 10:36

## 2025-03-31 RX ADMIN — IRON SUCROSE 200 MG: 20 INJECTION, SOLUTION INTRAVENOUS at 12:16

## 2025-03-31 RX ADMIN — SODIUM CHLORIDE, SODIUM LACTATE, POTASSIUM CHLORIDE, AND CALCIUM CHLORIDE: .6; .31; .03; .02 INJECTION, SOLUTION INTRAVENOUS at 10:18

## 2025-03-31 RX ADMIN — Medication 100 MCG: at 10:54

## 2025-03-31 RX ADMIN — LIDOCAINE HYDROCHLORIDE 100 MG: 20 INJECTION, SOLUTION EPIDURAL; INFILTRATION; INTRACAUDAL at 10:28

## 2025-03-31 RX ADMIN — PROPOFOL 130 MCG/KG/MIN: 10 INJECTION, EMULSION INTRAVENOUS at 10:36

## 2025-03-31 RX ADMIN — PROPOFOL 20 MG: 10 INJECTION, EMULSION INTRAVENOUS at 10:35

## 2025-03-31 NOTE — ASSESSMENT & PLAN NOTE
Worsening fatigue for the past week  Patient was placed on iron tabs and vitamin C but has been taking them inconsistently.  Notes possibly intermittent dark stools.  Other risk factors for anemia including heavy periods, positive celiac antibody test, positive for H. pylori via stool antigen in October 2023 (did not complete treatment and no follow-up recheck.  Patient).   PTA patient has been anemic as far back as 2018.  No prior EGD or colonoscopy in chart.  Hemoglobin 8.8, MCV 73 on admission  Note iron panel on 3/21 significant for ferritin of 2, iron 11  S/P Venofer 200 mg x3 this admission    Plan:  Ambulatory hematology referral upon discharge  FeSO4 Q2D upon discharge p.o.

## 2025-03-31 NOTE — ANESTHESIA PREPROCEDURE EVALUATION
Procedure:  COLONOSCOPY  EGD    Relevant Problems   HEMATOLOGY   (+) Iron deficiency anemia      MUSCULOSKELETAL   (+) Cervical myofascial pain syndrome      NEURO/PSYCH   (+) Cervical myofascial pain syndrome        Physical Exam    Airway    Mallampati score: III  TM Distance: >3 FB  Neck ROM: full     Dental   No notable dental hx     Cardiovascular  Cardiovascular exam normal    Pulmonary  Pulmonary exam normal     Other Findings  post-pubertal.      Anesthesia Plan  ASA Score- 3     Anesthesia Type- IV sedation with anesthesia with ASA Monitors.         Additional Monitors:     Airway Plan:            Plan Factors-Exercise tolerance (METS): >4 METS.    Chart reviewed.    Patient summary reviewed.    Patient is not a current smoker.              Induction- intravenous.    Postoperative Plan-         Informed Consent- Anesthetic plan and risks discussed with patient.        NPO Status:  Vitals Value Taken Time   Date of last liquid 03/31/25 03/31/25 0936   Time of last liquid 2300 03/31/25 0936   Date of last solid 03/29/25 03/31/25 0936   Time of last solid 1900 03/31/25 0936

## 2025-03-31 NOTE — ASSESSMENT & PLAN NOTE
Worsening fatigue for the past week  Patient was placed on iron tabs and vitamin C but has been taking them inconsistently.  Notes possibly intermittent dark stools.  Other risk factors for anemia including heavy periods, positive celiac antibody test, positive for H. pylori via stool antigen in October 2023 (did not complete treatment and no follow-up recheck.  Patient).   PTA patient has been anemic as far back as 2018.  No prior EGD or colonoscopy in chart.  Hemoglobin 8.8, MCV 73 on admission  Note iron panel on 3/21 significant for ferritin of 2, iron 11  S/P Venofer 200 mg in the ED x1 dose    Plan:  Continue Venofer 200 mg for 2 more doses  Continue PPI

## 2025-03-31 NOTE — PROGRESS NOTES
"Progress Note - Hospitalist   Name: Karine Villarreal 44 y.o. female I MRN: [id number]  Unit/Bed#: W -[facility] I Date of Admission: 3/29/2025   Date of Service: 3/31/2025 I Hospital Day: 1    Assessment & Plan  Schistocytes on peripheral blood smear  Per pathologist, \"rare shistocytes seen on peripheral smear; unable to determine if artifact or true shistocytes\". Recommended monitoring hemoglobin and platelets with potential hematology evaluation.  Anemia testing  Direct Cleo test negative  Fit test positive for occult blood  Immature Reticulocyte count normal  Hemolysis smear shows schistocytes.   wnl    Plan:  Follow haptoglobin  Follow-up H. pylori antigen stool  Follow-up hemoglobin fractionation cascade  Following GI recommendations   F/U results from EGD/COY today   Follow-up celiac serology  Consider hematology consult if scopes are unrevealing    Iron deficiency anemia  Worsening fatigue for the past week  Patient was placed on iron tabs and vitamin C but has been taking them inconsistently.  Notes possibly intermittent dark stools.  Other risk factors for anemia including heavy periods, positive celiac antibody test, positive for H. pylori via stool antigen in October 2023 (did not complete treatment and no follow-up recheck.  Patient).   PTA patient has been anemic as far back as 2018.  No prior EGD or colonoscopy in chart.  Hemoglobin 8.8, MCV 73 on admission  Note iron panel on 3/21 significant for ferritin of 2, iron 11  S/P Venofer 200 mg in the ED x1 dose    Plan:  Continue Venofer 200 mg for 2 more doses  Continue PPI  Dizziness      VTE Pharmacologic Prophylaxis: VTE Score: 2 Low Risk (Score 0-2) - Encourage Ambulation.    Mobility:   Basic Mobility Inpatient Raw Score: 24  JH-HLM Goal: 8: Walk 250 feet or more  JH-HLM Achieved: 8: Walk 250 feet ot more  JH-HLM Goal achieved. Continue to encourage appropriate mobility.    Patient Centered Rounds: I evaluated the patient without nursing " staff present due to nurse performing other clinical duties    Discussions with Specialists or Other Care Team Provider: Yes    Education and Discussions with Family / Patient: Patient declined call to .     Current Length of Stay: 1 day(s)  Current Patient Status: Inpatient   Certification Statement: The patient will continue to require additional inpatient hospital stay due to endoscopy/colonoscopy and hemolysis workup  Discharge Plan: Anticipate discharge tomorrow to home.    Code Status: Level 1 - Full Code    Subjective   Overall patient states she is feeling well, she completed the entirety of the bowel prep yesterday.  Ambulating around the floor yesterday she did endorse some intermittent mild lightheadedness that resolved upon resting.  He denies fever/chills, nausea/vomiting, cough, shortness of breath, chest pain, abdominal pain, changes in bladder habits.    Objective :  Temp:  [97.2 °F (36.2 °C)-99.3 °F (37.4 °C)] 97.2 °F (36.2 °C)  HR:  [66-75] 75  BP: ()/(56-61) 112/56  Resp:  [18-22] 18  SpO2:  [99 %-100 %] 100 %  O2 Device: None (Room air)    There is no height or weight on file to calculate BMI.     Input and Output Summary (last 24 hours):     Intake/Output Summary (Last 24 hours) at 3/31/2025 1002  Last data filed at 3/31/2025 0324  Gross per 24 hour   Intake 480 ml   Output 0 ml   Net 480 ml       Physical Exam  Vitals and nursing note reviewed.   Constitutional:       General: She is not in acute distress.     Appearance: She is well-developed. She is not ill-appearing or toxic-appearing.   HENT:      Head: Normocephalic and atraumatic.   Eyes:      Conjunctiva/sclera: Conjunctivae normal.   Cardiovascular:      Rate and Rhythm: Normal rate and regular rhythm.      Heart sounds: No murmur heard.  Pulmonary:      Effort: Pulmonary effort is normal. No respiratory distress.      Breath sounds: Normal breath sounds. No wheezing or rales.   Abdominal:      General: There is no  distension.      Palpations: Abdomen is soft.      Tenderness: There is no abdominal tenderness. There is no guarding.   Musculoskeletal:         General: No tenderness.      Cervical back: Neck supple.      Right lower leg: No edema.      Left lower leg: No edema.   Skin:     General: Skin is warm and dry.      Capillary Refill: Capillary refill takes less than 2 seconds.   Neurological:      Mental Status: She is alert.   Psychiatric:         Mood and Affect: Mood normal.           Lines/Drains:              Lab Results: I have reviewed the following results:   Results from last 7 days   Lab Units 03/31/25  0548 03/29/25  0206   WBC Thousand/uL 7.40 6.66   HEMOGLOBIN g/dL 9.6* 8.8*   HEMATOCRIT % 34.4* 30.9*   PLATELETS Thousands/uL 382 327   LYMPHO PCT %  --  39   MONO PCT %  --  9   EOS PCT %  --  2     Results from last 7 days   Lab Units 03/31/25  0548 03/29/25  0206   SODIUM mmol/L 138 137   POTASSIUM mmol/L 3.9 4.2   CHLORIDE mmol/L 108 111*   CO2 mmol/L 23 21   BUN mg/dL 8 12   CREATININE mg/dL 0.67 0.65   ANION GAP mmol/L 7 5   CALCIUM mg/dL 8.3* 8.5   ALBUMIN g/dL  --  3.7   TOTAL BILIRUBIN mg/dL  --  0.24   ALK PHOS U/L  --  44   ALT U/L  --  11   AST U/L  --  14   GLUCOSE RANDOM mg/dL 86 98                       Recent Cultures (last 7 days):         Imaging Results Review: No pertinent imaging studies reviewed.  Other Study Results Review: No additional pertinent studies reviewed.    Last 24 Hours Medication List:     Current Facility-Administered Medications:     acetaminophen (TYLENOL) tablet 650 mg, Q6H PRN    iron sucrose (VENOFER) 200 mg in sodium chloride 0.9 % 100 mL IVPB, Daily    pantoprazole (PROTONIX) EC tablet 40 mg, Early Morning    Administrative Statements   Today, Patient Was Seen By: Rommel Gardiner MD      **Please Note: This note may have been constructed using a voice recognition system.**

## 2025-03-31 NOTE — PLAN OF CARE
Problem: Potential for Falls  Goal: Patient will remain free of falls  Description: INTERVENTIONS:- Educate patient/family on patient safety including physical limitations- Instruct patient to call for assistance with activity - Consult OT/PT to assist with strengthening/mobility - Keep Call bell within reach- Keep bed low and locked with side rails adjusted as appropriate- Keep care items and personal belongings within reach- Initiate and maintain comfort rounds- Make Fall Risk Sign visible to staff- Offer Toileting every  Hours, in advance of need- Initiate/Maintain alarm- Obtain necessary fall risk management equipment: - Apply yellow socks and bracelet for high fall risk patients- Consider moving patient to room near nurses station  INTERVENTIONS:- Educate patient/family on patient safety including physical limitations- Instruct patient to call for assistance with activity - Consult OT/PT to assist with strengthening/mobility - Keep Call bell within reach- Keep bed low and locked with side rails adjusted as appropriate- Keep care items and personal belongings within reach- Initiate and maintain comfort rounds- Make Fall Risk Sign visible to staff- Offer Toileting every  Hours, in advance of need- Initiate/Maintain alarm- Obtain necessary fall risk management equipment:  Apply yellow socks and bracelet for high fall risk patients- Consider moving patient to room near nurses station  Outcome: Progressing     Problem: PAIN - ADULT  Goal: Verbalizes/displays adequate comfort level or baseline comfort level  Description: Interventions:- Encourage patient to monitor pain and request assistance- Assess pain using appropriate pain scale- Administer analgesics based on type and severity of pain and evaluate response- Implement non-pharmacological measures as appropriate and evaluate response- Consider cultural and social influences on pain and pain management- Notify physician/advanced practitioner if interventions  unsuccessful or patient reports new pain  Outcome: Progressing     Problem: INFECTION - ADULT  Goal: Absence or prevention of progression during hospitalization  Description: INTERVENTIONS:- Assess and monitor for signs and symptoms of infection- Monitor lab/diagnostic results- Monitor all insertion sites, i.e. indwelling lines, tubes, and drains- Monitor endotracheal if appropriate and nasal secretions for changes in amount and color- [place] appropriate cooling/warming therapies per order- Administer medications as ordered- Instruct and encourage patient and family to use good hand hygiene technique- Identify and instruct in appropriate isolation precautions for identified infection/condition  Outcome: Progressing  Goal: Absence of fever/infection during neutropenic period  Description: INTERVENTIONS:- Monitor WBC  Outcome: Progressing     Problem: SAFETY ADULT  Goal: Patient will remain free of falls  Description: INTERVENTIONS:- Educate patient/family on patient safety including physical limitations- Instruct patient to call for assistance with activity - Consult OT/PT to assist with strengthening/mobility - Keep Call bell within reach- Keep bed low and locked with side rails adjusted as appropriate- Keep care items and personal belongings within reach- Initiate and maintain comfort rounds- Make Fall Risk Sign visible to staff- Offer Toileting every Hours, in advance of need- Initiate/Maintain alarm- Obtain necessary fall risk management equipment: - Apply yellow socks and bracelet for high fall risk patients- Consider moving patient to room near nurses station  INTERVENTIONS:- Educate patient/family on patient safety including physical limitations- Instruct patient to call for assistance with activity - Consult OT/PT to assist with strengthening/mobility - Keep Call bell within reach- Keep bed low and locked with side rails adjusted as appropriate- Keep care items and personal belongings within reach-  Initiate and maintain comfort rounds- Make Fall Risk Sign visible to staff- Offer Toileting every  Hours, in advance of need- Initiate/Maintain alarm- Obtain necessary fall risk management equipment: - Apply yellow socks and bracelet for high fall risk patients- Consider moving patient to room near nurses station  Outcome: Progressing  Goal: Maintain or return to baseline ADL function  Description: INTERVENTIONS:-  Assess patient's ability to carry out ADLs; assess patient's baseline for ADL function and identify physical deficits which impact ability to perform ADLs (bathing, care of mouth/teeth, toileting, grooming, dressing, etc.)- Assess/evaluate cause of self-care deficits - Assess range of motion- Assess patient's mobility; develop plan if impaired- Assess patient's need for assistive devices and provide as appropriate- Encourage maximum independence but intervene and supervise when necessary- Involve family in performance of ADLs- Assess for home care needs following discharge - Consider OT consult to assist with ADL evaluation and planning for discharge- Provide patient education as appropriate  Outcome: Progressing  Goal: Maintains/Returns to pre admission functional level  Description: INTERVENTIONS:- Perform AM-PAC 6 Click Basic Mobility/ Daily Activity assessment daily.- Set and communicate daily mobility goal to care team and patient/family/caregiver. - Collaborate with rehabilitation services on mobility goals if consulted- Perform Range of Motion  times a day.- Reposition patient every  hours.- Dangle patient times a day- Stand patient  times a day- Ambulate patient  times a day- Out of bed to chair  times a day - Out of bed for meals  times a day- Out of bed for toileting- Record patient progress and toleration of activity level   Outcome: Progressing     Problem: DISCHARGE PLANNING  Goal: Discharge to home or other facility with appropriate resources  Description: INTERVENTIONS:- Identify barriers  to discharge w/patient and caregiver- Arrange for needed discharge resources and transportation as appropriate- Identify discharge learning needs (meds, wound care, etc.)- Arrange for interpretive services to assist at discharge as needed- Refer to Case Management Department for coordinating discharge planning if the patient needs post-hospital services based on physician/advanced practitioner order or complex needs related to functional status, cognitive ability, or social support system  Outcome: Progressing     Problem: Knowledge Deficit  Goal: Patient/family/caregiver demonstrates understanding of disease process, treatment plan, medications, and discharge instructions  Description: Complete learning assessment and assess knowledge base.Interventions:- Provide teaching at level of understanding- Provide teaching via preferred learning methods  Outcome: Progressing

## 2025-03-31 NOTE — DISCHARGE SUMMARY
"Discharge Summary - Hospitalist   Name: Karine Villarreal 44 y.o. 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 Recommended monitoring hemoglobin and platelets with potential hematology evaluation.  Anemia testing  Direct Cleo test negative  Fit test positive for occult blood  Immature Reticulocyte count normal  Hemolysis smear shows schistocytes.   wnl  H. pylori stool antigen normal  Haptoglobin 111    Plan:  Follow-up hemoglobin fractionation cascade outpatient  Following GI recommendations   Follow-up celiac serology outpatient  Ambulatory hematology referral    Iron deficiency anemia  Worsening fatigue for the past week  Patient was placed on iron tabs and vitamin C but has been taking them inconsistently.  Notes possibly intermittent dark stools.  Other risk factors for anemia including heavy periods, positive celiac antibody test, positive for H. pylori via stool antigen in October 2023 (did not complete treatment and no follow-up recheck.  Patient).   PTA patient has been anemic as far back as 2018.  No prior EGD or colonoscopy in chart.  Hemoglobin 8.8, MCV 73 on admission  Note iron panel on 3/21 significant for ferritin of 2, iron 11  S/P Venofer 200 mg x3 this admission    Plan:  Ambulatory hematology referral upon discharge  FeSO4 Q2D upon discharge p.o.     Medical Problems       Resolved Problems  Date Reviewed: 3/29/2025   None       Discharging Physician / Practitioner: Rommel Gardiner MD  PCP: Gloria Fernandez MD  Admission Date:   Admission Orders (From admission, onward)       Ordered        03/30/25 1423  INPATIENT ADMISSION  Once            03/29/25 0518  Place in Observation  Once                          Discharge Date: " 03/31/25    Consultations During Hospital Stay:  GI    Procedures Performed:   3/31/2025 EGD:  The esophagus appeared normal.  Ring in the lower third of the esophagus  The stomach appeared normal. Performed random biopsy to rule out H. pylori.  The duodenum appeared normal. Performed random biopsy to rule out celiac disease.  3/31/2025 COY:  The terminal ileum and entire colon appeared normal.  Repeat colonoscopy in 5 years, due: 3/30/2030  Inadequate bowel preparation      No polyps seen by prep was fair and required extensive irrigation and suctioning.   Recommend 2d prep.          Significant Findings / Test Results:   N/a    Incidental Findings:   N/a     Test Results Pending at Discharge (will require follow up):   Biopsies obtained during EGD  Hemoglobin fractionation cascade  Celiac panel     Outpatient Tests Requested:  CBC    Complications:  None    Reason for Admission: Symptomatic anemia    Hospital Course:   Karine Villarreal is a 44 y.o. female patient who originally presented to the hospital on 3/29/2025 due to symptomatic anemia.  Patient was feeling fatigued, lethargic and had dyspnea on exertion for the past week.  Patient was found to have some rare schistocytes upon smear review by pathologist when in the ED, therefore patient was admitted for further workup with hemolysis labs which were largely negative some of which remained pending. However given the degree of anemia to hgb 8.8 w/ MCV 73 and the symptoms that she had she received 3 days of Venofer infusions as well as GI consultation for EGD/COY both unremarkable for bleeding lesions, or any observable pathology stomach biopsies were taken to R/o H. pylori.  Patient was deemed medically stable for discharge with FeSO4 p.o. Q2D and ambulatory hematology and gastroenterology referrals placed.          Please see above list of diagnoses and related plan for additional information.     Condition at Discharge: stable    Discharge Day Visit /  Exam:   * Please refer to separate progress note for these details *    Discussion with Family: Patient declined call to .     Discharge instructions/Information to patient and family:   See after visit summary for information provided to patient and family.      Provisions for Follow-Up Care:  See after visit summary for information related to follow-up care and any pertinent home health orders.      Mobility at time of Discharge:   Basic Mobility Inpatient Raw Score: 24  JH-HLM Goal: 8: Walk 250 feet or more  JH-HLM Achieved: 8: Walk 250 feet ot more  HLM Goal achieved. Continue to encourage appropriate mobility.     Disposition:   Home    Planned Readmission: No    Discharge Medications:  See after visit summary for reconciled discharge medications provided to patient and/or family.      Administrative Statements   Discharge Statement:  I have spent a total time of 20 minutes in caring for this patient on the day of the visit/encounter.    **Please Note: This note may have been constructed using a voice recognition system**

## 2025-03-31 NOTE — ASSESSMENT & PLAN NOTE
"Per pathologist, \"rare shistocytes seen on peripheral smear; unable to determine if artifact or true shistocytes\". Recommended monitoring hemoglobin and platelets with potential hematology evaluation.  Anemia testing  Direct Cleo test negative  Fit test positive for occult blood  Immature Reticulocyte count normal  Hemolysis smear shows schistocytes.   wnl  H. pylori stool antigen normal  Haptoglobin 111    Plan:  Follow-up hemoglobin fractionation cascade outpatient  Following GI recommendations   Follow-up celiac serology outpatient  Ambulatory hematology referral    "

## 2025-03-31 NOTE — DISCHARGE INSTR - AVS FIRST PAGE
Dear Karine Villarreal,     It was our pleasure to care for you here at [facility].  It is our hope that we were always able to exceed the expected standards for your care during your stay.  You were hospitalized due to symptomatic anemia.  You were cared for on the fourth floor by Rommel Gardiner MD under the service of Marcelo Slaughter MD with the [facility] who covers for your primary care physician (PCP), Gloria Fernandez MD, while you were hospitalized.  If you have any questions or concerns related to this hospitalization, you may contact us at .  For follow up as well as any medication refills, we recommend that you follow up with your primary care physician.  A registered nurse will reach out to you by phone within a few days after your discharge to answer any additional questions that you may have after going home.  However, at this time we provide for you here, the most important instructions / recommendations at discharge:     Notable Medication Adjustments -   Please take ferrous sulfate 1 tablet every other morning by mouth.  Testing Required after Discharge -   Complete blood count within 1 week  ** Please contact your PCP to request testing orders for any of the testing recommended here **  Important follow up information -   Please follow-up with your primary care physician within 1 week.  Please establish care with [facility] hematology, referrals been placed.  Please establish care with [facility] gastroenterology, referrals been placed.  Other Instructions -   In the event that you feel severe fatigue, lethargy, shortness of breath please seek care in the emergency department.  Please review this entire after visit summary as additional general instructions including medication list, appointments, activity, diet, any pertinent wound care, and other additional recommendations from your care team that may be provided for  you.      Sincerely,     Rommel Gardiner MD

## 2025-03-31 NOTE — ANESTHESIA POSTPROCEDURE EVALUATION
Post-Op Assessment Note    Last Filed PACU Vitals:  Vitals Value Taken Time   Temp 97 °F (36.1 °C) 03/31/25 1109   Pulse 67 03/31/25 1129   BP 94/53 03/31/25 1129   Resp 16 03/31/25 1129   SpO2 100 % 03/31/25 1129       Modified Shirin:     Vitals Value Taken Time   Activity 2 03/31/25 1119   Respiration 2 03/31/25 1119   Circulation 2 03/31/25 1119   Consciousness 2 03/31/25 1119   Oxygen Saturation 2 03/31/25 1119     Modified Shirin Score: 10

## 2025-03-31 NOTE — ASSESSMENT & PLAN NOTE
"Per pathologist, \"rare shistocytes seen on peripheral smear; unable to determine if artifact or true shistocytes\". Recommended monitoring hemoglobin and platelets with potential hematology evaluation.  Anemia testing  Direct Cleo test negative  Fit test positive for occult blood  Immature Reticulocyte count normal  Hemolysis smear shows schistocytes.   wnl    Plan:  Follow haptoglobin  Follow-up H. pylori antigen stool  Follow-up hemoglobin fractionation cascade  Following GI recommendations   F/U results from EGD/COY today   Follow-up celiac serology  Consider hematology consult if scopes are unrevealing    "

## 2025-03-31 NOTE — PLAN OF CARE
Problem: Potential for Falls  Goal: Patient will remain free of falls  Description: INTERVENTIONS:- Educate patient/family on patient safety including physical limitations- Instruct patient to call for assistance with activity - Consult OT/PT to assist with strengthening/mobility - Keep Call bell within reach- Keep bed low and locked with side rails adjusted as appropriate- Keep care items and personal belongings within reach- Initiate and maintain comfort rounds- Make Fall Risk Sign visible to staff- Offer Toileting every  Hours, in advance of need- Initiate/Maintain alarm- Obtain necessary fall risk management equipment: - Apply yellow socks and bracelet for high fall risk patients- Consider moving patient to room near nurses station  INTERVENTIONS:- Educate patient/family on patient safety including physical limitations- Instruct patient to call for assistance with activity - Consult OT/PT to assist with strengthening/mobility - Keep Call bell within reach- Keep bed low and locked with side rails adjusted as appropriate- Keep care items and personal belongings within reach- Initiate and maintain comfort rounds- Make Fall Risk Sign visible to staff- Offer Toileting every  Hours, in advance of need- Initiate/Maintain alarm- Obtain necessary fall risk management equipment: - Apply yellow socks and bracelet for high fall risk patients- Consider moving patient to room near nurses station  Outcome: Progressing     Problem: PAIN - ADULT  Goal: Verbalizes/displays adequate comfort level or baseline comfort level  Description: Interventions:- Encourage patient to monitor pain and request assistance- Assess pain using appropriate pain scale- Administer analgesics based on type and severity of pain and evaluate response- Implement non-pharmacological measures as appropriate and evaluate response- Consider cultural and social influences on pain and pain management- Notify physician/advanced practitioner if interventions  unsuccessful or patient reports new pain  Outcome: Progressing     Problem: INFECTION - ADULT  Goal: Absence or prevention of progression during hospitalization  Description: INTERVENTIONS:- Assess and monitor for signs and symptoms of infection- Monitor lab/diagnostic results- Monitor all insertion sites, i.e. indwelling lines, tubes, and drains- Monitor endotracheal if appropriate and nasal secretions for changes in amount and color- [place] appropriate cooling/warming therapies per order- Administer medications as ordered- Instruct and encourage patient and family to use good hand hygiene technique- Identify and instruct in appropriate isolation precautions for identified infection/condition  Outcome: Progressing  Goal: Absence of fever/infection during neutropenic period  Description: INTERVENTIONS:- Monitor WBC  Outcome: Progressing     Problem: SAFETY ADULT  Goal: Patient will remain free of falls  Description: INTERVENTIONS:- Educate patient/family on patient safety including physical limitations- Instruct patient to call for assistance with activity - Consult OT/PT to assist with strengthening/mobility - Keep Call bell within reach- Keep bed low and locked with side rails adjusted as appropriate- Keep care items and personal belongings within reach- Initiate and maintain comfort rounds- Make Fall Risk Sign visible to staff- Offer Toileting every  Hours, in advance of need- Initiate/Maintain alarm- Obtain necessary fall risk management equipment: - Apply yellow socks and bracelet for high fall risk patients- Consider moving patient to room near nurses station  INTERVENTIONS:- Educate patient/family on patient safety including physical limitations- Instruct patient to call for assistance with activity - Consult OT/PT to assist with strengthening/mobility - Keep Call bell within reach- Keep bed low and locked with side rails adjusted as appropriate- Keep care items and personal belongings within reach-  Initiate and maintain comfort rounds- Make Fall Risk Sign visible to staff- Offer Toileting every  Hours, in advance of need- Initiate/Maintain alarm- Obtain necessary fall risk management equipment: - Apply yellow socks and bracelet for high fall risk patients- Consider moving patient to room near nurses station  Outcome: Progressing  Goal: Maintain or return to baseline ADL function  Description: INTERVENTIONS:-  Assess patient's ability to carry out ADLs; assess patient's baseline for ADL function and identify physical deficits which impact ability to perform ADLs (bathing, care of mouth/teeth, toileting, grooming, dressing, etc.)- Assess/evaluate cause of self-care deficits - Assess range of motion- Assess patient's mobility; develop plan if impaired- Assess patient's need for assistive devices and provide as appropriate- Encourage maximum independence but intervene and supervise when necessary- Involve family in performance of ADLs- Assess for home care needs following discharge - Consider OT consult to assist with ADL evaluation and planning for discharge- Provide patient education as appropriate  Outcome: Progressing  Goal: Maintains/Returns to pre admission functional level  Description: INTERVENTIONS:- Perform AM-PAC 6 Click Basic Mobility/ Daily Activity assessment daily.- Set and communicate daily mobility goal to care team and patient/family/caregiver. - Collaborate with rehabilitation services on mobility goals if consulted- Perform Range of Motion  times a day.- Reposition patient every  hours.- Dangle patient  times a day- Stand patient  times a day- Ambulate patient  times a day- Out of bed to chair  times a day - Out of bed for meals  times a day- Out of bed for toileting- Record patient progress and toleration of activity level   Outcome: Progressing     Problem: DISCHARGE PLANNING  Goal: Discharge to home or other facility with appropriate resources  Description: INTERVENTIONS:- Identify barriers  to discharge w/patient and caregiver- Arrange for needed discharge resources and transportation as appropriate- Identify discharge learning needs (meds, wound care, etc.)- Arrange for interpretive services to assist at discharge as needed- Refer to Case Management Department for coordinating discharge planning if the patient needs post-hospital services based on physician/advanced practitioner order or complex needs related to functional status, cognitive ability, or social support system  Outcome: Progressing     Problem: Knowledge Deficit  Goal: Patient/family/caregiver demonstrates understanding of disease process, treatment plan, medications, and discharge instructions  Description: Complete learning assessment and assess knowledge base.Interventions:- Provide teaching at level of understanding- Provide teaching via preferred learning methods  Outcome: Progressing

## 2025-03-31 NOTE — ANESTHESIA POSTPROCEDURE EVALUATION
Post-Op Assessment Note    CV Status:  Stable  Pain Score: 0    Pain management: adequate       Mental Status:  Awake and alert   Hydration Status:  Stable   PONV Controlled:  None   Airway Patency:  Patent and adequate     Post Op Vitals Reviewed: Yes    No anethesia notable event occurred.    Staff: CRNA, Anesthesiologist       Last Filed PACU Vitals:  Vitals Value Taken Time   Temp     Pulse 66    BP 89/47    Resp 16    SpO2 100

## 2025-04-01 ENCOUNTER — TRANSITIONAL CARE MANAGEMENT (OUTPATIENT)
Dept: FAMILY MEDICINE CLINIC | Facility: CLINIC | Age: 45
End: 2025-04-01

## 2025-04-01 ENCOUNTER — TELEPHONE (OUTPATIENT)
Dept: FAMILY MEDICINE CLINIC | Facility: CLINIC | Age: 45
End: 2025-04-01

## 2025-04-01 LAB
ATRIAL RATE: 69 BPM
P AXIS: 78 DEGREES
PR INTERVAL: 166 MS
QRS AXIS: 69 DEGREES
QRSD INTERVAL: 80 MS
QT INTERVAL: 408 MS
QTC INTERVAL: 437 MS
T WAVE AXIS: 68 DEGREES
VENTRICULAR RATE: 69 BPM

## 2025-04-01 PROCEDURE — 93010 ELECTROCARDIOGRAM REPORT: CPT | Performed by: STUDENT IN AN ORGANIZED HEALTH CARE EDUCATION/TRAINING PROGRAM

## 2025-04-01 NOTE — TELEPHONE ENCOUNTER
Left message to schedule TCM. Please transfer call to the office to schedule TCM. 15 minutes okay per Dr. Fernandez.

## 2025-04-01 NOTE — UTILIZATION REVIEW
NOTIFICATION OF ADMISSION DISCHARGE   This is a Notification of Discharge from [facility]. Please be advised that this patient has been discharge from our facility. Below you will find the admission and discharge date and time including the patient’s disposition.   UTILIZATION REVIEW CONTACT:  Utilization Review Assistants  Network Utilization Review Department  Phone: [phone] x carefully listen to the prompts. All voicemails are confidential.  Email: [email or web address]     ADMISSION INFORMATION  PRESENTATION DATE: 3/29/2025  1:10 AM  OBERVATION ADMISSION DATE: 03/29/2025 0518  INPATIENT ADMISSION DATE: 3/30/25  2:23 PM   DISCHARGE DATE: 3/31/2025  4:40 PM   DISPOSITION:Home/Self Care    Network Utilization Review Department  ATTENTION: Please call with any questions or concerns to [phone] and carefully listen to the prompts so that you are directed to the right person. All voicemails are confidential.   For Discharge needs, contact Care Management DC Support Team at [phone] opt. 2  Send all requests for admission clinical reviews, approved or denied determinations and any other requests to dedicated fax number below belonging to the campus where the patient is receiving treatment. List of dedicated fax numbers for the Facilities:  FACILITY NAME UR FAX NUMBER   ADMISSION DENIALS (Administrative/Medical Necessity) [phone]   DISCHARGE SUPPORT TEAM ([facility]) [phone]   PARENT CHILD HEALTH (Maternity/NICU/Pediatrics) [phone]   [facility] [phone]   [facility] [phone]   [facility] [phone]   [facility] [phone]   [facility] [phone]   [facility] [phone]   [facility] [phone]   [facility] [phone]    [facility] [phone]   [facility] [phone]   [facility] [phone]   [facility] [phone]

## 2025-04-02 LAB — TTG IGA SER IA-ACNC: 1.8 U/ML (ref ?–10)

## 2025-04-03 ENCOUNTER — OFFICE VISIT (OUTPATIENT)
Dept: FAMILY MEDICINE CLINIC | Facility: CLINIC | Age: 45
End: 2025-04-03
Payer: COMMERCIAL

## 2025-04-03 ENCOUNTER — RESULTS FOLLOW-UP (OUTPATIENT)
Age: 45
End: 2025-04-03

## 2025-04-03 VITALS
OXYGEN SATURATION: 98 % | DIASTOLIC BLOOD PRESSURE: 70 MMHG | WEIGHT: 156 LBS | SYSTOLIC BLOOD PRESSURE: 120 MMHG | TEMPERATURE: 98.1 F | BODY MASS INDEX: 27.64 KG/M2 | RESPIRATION RATE: 17 BRPM | HEIGHT: 63 IN | HEART RATE: 70 BPM

## 2025-04-03 DIAGNOSIS — R42 DIZZINESS: ICD-10-CM

## 2025-04-03 DIAGNOSIS — Z09 HOSPITAL DISCHARGE FOLLOW-UP: Primary | ICD-10-CM

## 2025-04-03 DIAGNOSIS — R71.8 SCHISTOCYTES ON PERIPHERAL BLOOD SMEAR: ICD-10-CM

## 2025-04-03 DIAGNOSIS — D50.8 OTHER IRON DEFICIENCY ANEMIA: ICD-10-CM

## 2025-04-03 PROBLEM — A04.8 H. PYLORI INFECTION: Status: RESOLVED | Noted: 2023-11-29 | Resolved: 2025-04-03

## 2025-04-03 LAB
HGB A MFR BLD: 1.9 % (ref 1.8–3.2)
HGB A MFR BLD: 98.1 % (ref 96.4–98.8)
HGB F MFR BLD: 0 % (ref 0–2)
HGB FRACT BLD-IMP: NORMAL
HGB S MFR BLD: 0 %

## 2025-04-03 PROCEDURE — 99496 TRANSJ CARE MGMT HIGH F2F 7D: CPT | Performed by: FAMILY MEDICINE

## 2025-04-03 PROCEDURE — 88305 TISSUE EXAM BY PATHOLOGIST: CPT | Performed by: STUDENT IN AN ORGANIZED HEALTH CARE EDUCATION/TRAINING PROGRAM

## 2025-04-03 NOTE — PROGRESS NOTES
Transition of Care Visit:  Name: Karine Villarreal      : 1980      MRN: [id number]  Encounter Provider: Gloria Fernandez MD  Encounter Date: 4/3/2025   Encounter department: [facility]    Assessment & Plan  Hospital discharge follow-up         Other iron deficiency anemia  S/p venofer x 3 doses  To check labs again   Seeing hematologist next month     Orders:  •  CBC and differential  •  Iron Panel (Includes Ferritin, Iron Sat%, Iron, and TIBC); Future    Dizziness  Due to low iron and hemoglobin   Feeling better       Schistocytes on peripheral blood smear  Hemoglobin fractionation cascade is still pending            History of Present Illness     Transitional Care Management Review:   Karine Villarreal is a 44 y.o. female here for TCM follow up.     During the TCM phone call patient stated:  TCM Call (since 3/20/2025)     Date and time call was made  2025  9:51 AM    Patient was hospitialized at  [facility]    Date of Admission  25    Date of discharge  25    Diagnosis  Schistocytes on peripheral blood smear    Disposition  Home    Were the patients medications reviewed and updated  Yes      TCM Call (since 3/20/2025)     Scheduled for follow up?  Yes    Did you obtain your prescribed medications  Yes    Do you need help managing your prescriptions or medications  No    Is transportation to your appointment needed  No    I have advised the patient to call PCP with any new or worsening symptoms  LauraMR Erica    Living Arrangements  Alone    Support System  Family    The type of support provided  Emotional; Physical    Are you recieving home care services  No        HPI  Here for hospital follow up  Was admitted on 3/29/2025 for symptomatic anemia where she feels shortness of breath and dizzy and fatigue   She recived 3 days of venofer infusions  EGD and colonoscopy were normal   Negative for H. Pylori   She was sent home with ferrous sulfate 324 mg  "every other day   She feels better overall    Review of Systems   Constitutional: Negative.    HENT: Negative.     Eyes: Negative.    Respiratory: Negative.     Cardiovascular: Negative.    Endocrine: Negative.    Musculoskeletal: Negative.    Hematological: Negative.    Psychiatric/Behavioral: Negative.       Objective   /70 (BP Location: Left arm, Patient Position: Sitting, Cuff Size: Standard)   Pulse 70   Temp 98.1 °F (36.7 °C) (Tympanic)   Resp 17   Ht 5' 3\" (1.6 m)   Wt 70.8 kg (156 lb)   LMP 03/22/2025 (Approximate)   SpO2 98%   BMI 27.63 kg/m²     Physical Exam  Constitutional:       Appearance: Normal appearance.   HENT:      Mouth/Throat:      Mouth: Mucous membranes are moist.   Eyes:      Extraocular Movements: Extraocular movements intact.      Pupils: Pupils are equal, round, and reactive to light.   Cardiovascular:      Rate and Rhythm: Normal rate and regular rhythm.      Pulses: Normal pulses.      Heart sounds: Normal heart sounds.   Pulmonary:      Effort: Pulmonary effort is normal.      Breath sounds: Normal breath sounds.   Skin:     Capillary Refill: Capillary refill takes less than 2 seconds.   Neurological:      General: No focal deficit present.      Mental Status: She is alert and oriented to person, place, and time.   Psychiatric:         Mood and Affect: Mood normal.         Behavior: Behavior normal.       Medications have been reviewed by provider in current encounter      "

## 2025-04-03 NOTE — ASSESSMENT & PLAN NOTE
S/p venofer x 3 doses  To check labs again   Seeing hematologist next month     Orders:  •  CBC and differential  •  Iron Panel (Includes Ferritin, Iron Sat%, Iron, and TIBC); Future

## 2025-04-08 ENCOUNTER — TELEPHONE (OUTPATIENT)
Dept: GASTROENTEROLOGY | Facility: AMBULARY SURGERY CENTER | Age: 45
End: 2025-04-08

## 2025-04-08 NOTE — TELEPHONE ENCOUNTER
Spoke with patient - she not experiencing any GI issues at this time, appt cx'd for 4/10.    Patient advised to call office if she envelopes any issues.

## 2025-04-10 ENCOUNTER — OFFICE VISIT (OUTPATIENT)
Dept: OBGYN CLINIC | Facility: CLINIC | Age: 45
End: 2025-04-10
Payer: COMMERCIAL

## 2025-04-10 VITALS
WEIGHT: 156 LBS | HEIGHT: 63 IN | SYSTOLIC BLOOD PRESSURE: 114 MMHG | BODY MASS INDEX: 27.64 KG/M2 | DIASTOLIC BLOOD PRESSURE: 66 MMHG

## 2025-04-10 DIAGNOSIS — Z12.31 ENCOUNTER FOR SCREENING MAMMOGRAM FOR MALIGNANT NEOPLASM OF BREAST: ICD-10-CM

## 2025-04-10 DIAGNOSIS — Z11.51 SCREENING FOR HPV (HUMAN PAPILLOMAVIRUS): ICD-10-CM

## 2025-04-10 DIAGNOSIS — Z12.4 CERVICAL CANCER SCREENING: ICD-10-CM

## 2025-04-10 DIAGNOSIS — Z12.4 SCREENING FOR CERVICAL CANCER: ICD-10-CM

## 2025-04-10 DIAGNOSIS — Z12.39 ENCOUNTER FOR SCREENING BREAST EXAMINATION: ICD-10-CM

## 2025-04-10 DIAGNOSIS — Z01.419 ENCOUNTER FOR WELL WOMAN EXAM: Primary | ICD-10-CM

## 2025-04-10 DIAGNOSIS — Z12.4 ENCOUNTER FOR SCREENING FOR MALIGNANT NEOPLASM OF CERVIX: ICD-10-CM

## 2025-04-10 DIAGNOSIS — Z01.419 WELL WOMAN EXAM WITH ROUTINE GYNECOLOGICAL EXAM: ICD-10-CM

## 2025-04-10 PROCEDURE — G0476 HPV COMBO ASSAY CA SCREEN: HCPCS | Performed by: PHYSICIAN ASSISTANT

## 2025-04-10 PROCEDURE — G0145 SCR C/V CYTO,THINLAYER,RESCR: HCPCS | Performed by: PHYSICIAN ASSISTANT

## 2025-04-10 PROCEDURE — 99396 PREV VISIT EST AGE 40-64: CPT | Performed by: PHYSICIAN ASSISTANT

## 2025-04-11 ENCOUNTER — APPOINTMENT (OUTPATIENT)
Dept: LAB | Facility: CLINIC | Age: 45
End: 2025-04-11
Attending: FAMILY MEDICINE
Payer: COMMERCIAL

## 2025-04-11 ENCOUNTER — TELEPHONE (OUTPATIENT)
Dept: GASTROENTEROLOGY | Facility: AMBULARY SURGERY CENTER | Age: 45
End: 2025-04-11

## 2025-04-11 ENCOUNTER — RESULTS FOLLOW-UP (OUTPATIENT)
Dept: FAMILY MEDICINE CLINIC | Facility: CLINIC | Age: 45
End: 2025-04-11

## 2025-04-11 DIAGNOSIS — D50.8 OTHER IRON DEFICIENCY ANEMIA: ICD-10-CM

## 2025-04-11 DIAGNOSIS — R10.13 EPIGASTRIC ABDOMINAL PAIN: ICD-10-CM

## 2025-04-11 LAB
ANISOCYTOSIS BLD QL SMEAR: PRESENT
BASOPHILS # BLD MANUAL: 0 THOUSAND/UL (ref 0–0.1)
BASOPHILS NFR MAR MANUAL: 0 % (ref 0–1)
EOSINOPHIL # BLD MANUAL: 0.07 THOUSAND/UL (ref 0–0.4)
EOSINOPHIL NFR BLD MANUAL: 1 % (ref 0–6)
ERYTHROCYTE [DISTWIDTH] IN BLOOD BY AUTOMATED COUNT: 23.9 % (ref 11.6–15.1)
FERRITIN SERPL-MCNC: 91 NG/ML (ref 30–307)
GIANT PLATELETS BLD QL SMEAR: PRESENT
HCT VFR BLD AUTO: 38.7 % (ref 34.8–46.1)
HGB BLD-MCNC: 10.9 G/DL (ref 11.5–15.4)
HPV HR 12 DNA CVX QL NAA+PROBE: NEGATIVE
HPV16 DNA CVX QL NAA+PROBE: NEGATIVE
HPV18 DNA CVX QL NAA+PROBE: NEGATIVE
IRON SATN MFR SERPL: 12 % (ref 15–50)
IRON SERPL-MCNC: 52 UG/DL (ref 50–212)
LYMPHOCYTES # BLD AUTO: 2.64 THOUSAND/UL (ref 0.6–4.47)
LYMPHOCYTES # BLD AUTO: 37 % (ref 14–44)
MCH RBC QN AUTO: 22 PG (ref 26.8–34.3)
MCHC RBC AUTO-ENTMCNC: 28.2 G/DL (ref 31.4–37.4)
MCV RBC AUTO: 78 FL (ref 82–98)
MONOCYTES # BLD AUTO: 0.29 THOUSAND/UL (ref 0–1.22)
MONOCYTES NFR BLD: 4 % (ref 4–12)
NEUTROPHILS # BLD MANUAL: 4.14 THOUSAND/UL (ref 1.85–7.62)
NEUTS SEG NFR BLD AUTO: 58 % (ref 43–75)
OVALOCYTES BLD QL SMEAR: PRESENT
PLATELET # BLD AUTO: 270 THOUSANDS/UL (ref 149–390)
PLATELET BLD QL SMEAR: ADEQUATE
POIKILOCYTOSIS BLD QL SMEAR: PRESENT
RBC # BLD AUTO: 4.95 MILLION/UL (ref 3.81–5.12)
RBC MORPH BLD: PRESENT
TIBC SERPL-MCNC: 420 UG/DL (ref 250–450)
TRANSFERRIN SERPL-MCNC: 300 MG/DL (ref 203–362)
UIBC SERPL-MCNC: 368 UG/DL (ref 155–355)
WBC # BLD AUTO: 7.13 THOUSAND/UL (ref 4.31–10.16)

## 2025-04-11 PROCEDURE — 82728 ASSAY OF FERRITIN: CPT

## 2025-04-11 PROCEDURE — 85007 BL SMEAR W/DIFF WBC COUNT: CPT | Performed by: FAMILY MEDICINE

## 2025-04-11 PROCEDURE — 85027 COMPLETE CBC AUTOMATED: CPT | Performed by: FAMILY MEDICINE

## 2025-04-11 PROCEDURE — 83540 ASSAY OF IRON: CPT

## 2025-04-11 PROCEDURE — 36415 COLL VENOUS BLD VENIPUNCTURE: CPT

## 2025-04-11 PROCEDURE — 83550 IRON BINDING TEST: CPT

## 2025-04-11 NOTE — TELEPHONE ENCOUNTER
Called and left message advising patient that at this time we recommend that she follows up with hematology/oncology and she already has an appointment scheduled on 5/5/25. So at this time we will need to cancel her 6/23/25 appointment with GI. Instructed her to return call with any further questions or concerns. Also she can send a [employer] message. I also advised that I will send a message in her [employer] [facility] as well

## 2025-04-14 ENCOUNTER — RESULTS FOLLOW-UP (OUTPATIENT)
Dept: FAMILY MEDICINE CLINIC | Facility: CLINIC | Age: 45
End: 2025-04-14

## 2025-04-15 LAB
LAB AP GYN PRIMARY INTERPRETATION: NORMAL
Lab: NORMAL
PATH INTERP SPEC-IMP: NORMAL

## 2025-04-16 ENCOUNTER — RESULTS FOLLOW-UP (OUTPATIENT)
Dept: OBGYN CLINIC | Facility: CLINIC | Age: 45
End: 2025-04-16

## 2025-04-16 NOTE — PROGRESS NOTES
":  Assessment & Plan  Encounter for well woman exam         Encounter for screening breast examination         Cervical cancer screening         Screening for HPV (human papillomavirus)    Orders:    Liquid-based pap, screening    HPV High Risk    Encounter for screening for malignant neoplasm of cervix    Orders:    Liquid-based pap, screening    HPV High Risk    Encounter for screening mammogram for malignant neoplasm of breast    Orders:    Mammo screening bilateral w 3d and cad; Future    Screening for cervical cancer    Orders:    Ambulatory referral to Obstetrics / Gynecology    Well woman exam with routine gynecological exam    Orders:    Liquid-based pap, screening    HPV High Risk        History of Present Illness     Karine Villarreal is a 44 y.o. female   Pt presents for her annual exam today--  She has no gyn complaints  She has regular bleeding   no pelvic pain  Bowel and bladder are regular  Colonoscopy--25  No breast concerns today  Last mammo--25    pap today.    Rx mammo  Daily mvi      Review of Systems   Constitutional:  Negative for chills, fever and unexpected weight change.   HENT:  Negative for ear pain and sore throat.    Eyes:  Negative for pain and visual disturbance.   Respiratory:  Negative for cough and shortness of breath.    Cardiovascular:  Negative for chest pain and palpitations.   Gastrointestinal:  Negative for abdominal pain, blood in stool, constipation, diarrhea and vomiting.   Genitourinary: Negative.  Negative for dysuria and hematuria.   Musculoskeletal:  Negative for arthralgias and back pain.   Skin:  Negative for color change and rash.   Neurological:  Negative for seizures and syncope.   All other systems reviewed and are negative.    Objective   /66 (BP Location: Right arm, Patient Position: Sitting, Cuff Size: Standard)   Ht 5' 3\" (1.6 m)   Wt 70.8 kg (156 lb)   LMP 03/22/2025 (Approximate)   BMI 27.63 kg/m²      Physical Exam  Vitals and nursing note " reviewed.   Constitutional:       General: She is not in acute distress.     Appearance: Normal appearance. She is well-developed.   HENT:      Head: Normocephalic and atraumatic.   Eyes:      Conjunctiva/sclera: Conjunctivae normal.   Cardiovascular:      Rate and Rhythm: Normal rate and regular rhythm.      Heart sounds: No murmur heard.  Pulmonary:      Effort: Pulmonary effort is normal. No respiratory distress.      Breath sounds: Normal breath sounds.   Chest:   Breasts:     Right: Normal.      Left: Normal.   Abdominal:      Palpations: Abdomen is soft.      Tenderness: There is no abdominal tenderness.   Genitourinary:     General: Normal vulva.      Vagina: Normal.      Cervix: Normal.      Uterus: Normal.       Adnexa: Right adnexa normal and left adnexa normal.      Rectum: Normal.   Musculoskeletal:         General: No swelling.      Cervical back: Neck supple.   Skin:     General: Skin is warm and dry.      Capillary Refill: Capillary refill takes less than 2 seconds.   Neurological:      Mental Status: She is alert.   Psychiatric:         Mood and Affect: Mood normal.

## 2025-04-17 DIAGNOSIS — B37.31 YEAST VAGINITIS: Primary | ICD-10-CM

## 2025-04-17 RX ORDER — NYSTATIN 100000 U/G
CREAM TOPICAL 2 TIMES DAILY
Qty: 15 G | Refills: 0 | Status: SHIPPED | OUTPATIENT
Start: 2025-04-17 | End: 2025-04-24

## 2025-05-01 ENCOUNTER — TELEPHONE (OUTPATIENT)
Dept: HEMATOLOGY ONCOLOGY | Facility: CLINIC | Age: 45
End: 2025-05-01

## 2025-05-01 ENCOUNTER — OFFICE VISIT (OUTPATIENT)
Dept: HEMATOLOGY ONCOLOGY | Facility: CLINIC | Age: 45
End: 2025-05-01
Payer: COMMERCIAL

## 2025-05-01 VITALS
HEART RATE: 110 BPM | RESPIRATION RATE: 16 BRPM | OXYGEN SATURATION: 97 % | SYSTOLIC BLOOD PRESSURE: 116 MMHG | WEIGHT: 157 LBS | BODY MASS INDEX: 27.82 KG/M2 | TEMPERATURE: 98.4 F | HEIGHT: 63 IN | DIASTOLIC BLOOD PRESSURE: 74 MMHG

## 2025-05-01 DIAGNOSIS — R53.83 FATIGUE: ICD-10-CM

## 2025-05-01 DIAGNOSIS — D50.8 OTHER IRON DEFICIENCY ANEMIA: Primary | ICD-10-CM

## 2025-05-01 DIAGNOSIS — D50.9 IRON DEFICIENCY ANEMIA: ICD-10-CM

## 2025-05-01 PROCEDURE — 99244 OFF/OP CNSLTJ NEW/EST MOD 40: CPT | Performed by: NURSE PRACTITIONER

## 2025-05-01 RX ORDER — ACETAMINOPHEN 325 MG/1
650 TABLET ORAL ONCE
Start: 2025-05-20 | End: 2025-05-14

## 2025-05-01 RX ORDER — SODIUM CHLORIDE 9 MG/ML
20 INJECTION, SOLUTION INTRAVENOUS ONCE
OUTPATIENT
Start: 2025-05-20

## 2025-05-01 NOTE — PATIENT INSTRUCTIONS
Natures made iron gummy ferous fumerate mg (2 Gummies) once daily.       Or a prenatal multivitamin with iron soft gel    moderate

## 2025-05-01 NOTE — PROGRESS NOTES
Name: Karine Villarreal      : 1980      MRN: [id number]  Encounter Provider: ARABELLA Lopez  Encounter Date: 2025   Encounter department: [facility] [place]  :  Assessment & Plan  Fatigue   Fatigue has improved, hemoglobin improved after iron infusions.  EGD showed normal esophagus stomach and duodenum, colonoscopy showed terminal ileum and entire colon appearing normal.  Patient has been on oral iron tolerating without difficulty.  We made arrangements for another round of iron infusions Venofer 200 mg IV x 3.  Although she received iron infusions in the hospital I   I reviewed indications and adverse reactions including shortness of breath, chest heaviness, headache, itching, Anaphylaxis/allergic reaction, arthralgias/myalgias, nausea; agrees to proceed with plan. Patient is advised to contact our office if they has any of the symptoms. Please let the infusion nurses know if you have any symptoms during the infusion.  If you tolerate the infusion and have side effects at home please contact our office if you has any of the symptoms.  During the infusion you can eat and drink.  Please bring something to do because you will be there for about 2 to 2-1/2 hours.  She states she had a headache after the 2nd and 3rd infusion.  Will plan for Tylenol 650 mg p.o. once as a premed.    Orders:    Ambulatory Referral to Hematology / Oncology    CBC; Future    Iron Panel (Includes Ferritin, Iron Sat%, Iron, and TIBC); Future    Iron deficiency anemia   Recommended patient try a gummy iron supplement such as natures made ferrous fumarate 18 mg or a prenatal vitamin with iron softgel.    Orders:    Ambulatory Referral to Hematology / Oncology    CBC; Future    Iron Panel (Includes Ferritin, Iron Sat%, Iron, and TIBC); Future    Other iron deficiency anemia    Orders:    CBC; Future    Iron Panel (Includes Ferritin, Iron Sat%, Iron, and TIBC); Future      Return in about 4  "months (around 9/3/2025) for Office Visit, Infusion - See Treatment Plan.    History of Present Illness   Chief Complaint   Patient presents with    Consult     Pertinent Medical History     05/05/25: 44 y.o. female with a PMH of iron deficiency anemia felt fatigued and lethargic and was seen in the ER on 3/29/2025. She started taking iron supplementation on 3/28/2025 as prescribed by her PCP due to ferritin of 2, hemoglobin 8.8 MCV 73.  She was admitted for further evaluation after schistocytes found on hemolysis smear.  She received Venofer 200 mg IV x 3 during hospital stay.  Direct Cleo test negative, fit test positive for occult blood, LDH and haptoglobin within normal limits patient has heavy menses and previous history of H. pylori via stool antigen in October 2023.  She did not complete treatment or have follow-up.  Per patient she has been anemic since at least 2018.  CBC on day of discharge 4/11/2025 showed a hemoglobin of 10.9, MCV 78, normal WBC and platelet count.     Review of Systems   Constitutional:  Negative for activity change, appetite change, fatigue, fever and unexpected weight change.   Respiratory:  Negative for cough and shortness of breath.    Cardiovascular:  Negative for chest pain and leg swelling.   Gastrointestinal:  Negative for abdominal pain, constipation, diarrhea and nausea.   Endocrine: Negative for cold intolerance and heat intolerance.   Musculoskeletal:  Negative for arthralgias and myalgias.   Skin: Negative.    Neurological:  Negative for dizziness, weakness and headaches.   Hematological:  Negative for adenopathy. Does not bruise/bleed easily.         Objective   /74 (BP Location: Left arm, Patient Position: Sitting, Cuff Size: Standard)   Pulse (!) 110   Temp 98.4 °F (36.9 °C) (Temporal)   Resp 16   Ht 5' 3\" (1.6 m)   Wt 71.2 kg (157 lb)   SpO2 97%   BMI 27.81 kg/m²     Physical Exam  Vitals reviewed.   Constitutional:       Appearance: Normal appearance. " She is well-developed.   HENT:      Head: Normocephalic and atraumatic.   Eyes:      Conjunctiva/sclera: Conjunctivae normal.      Pupils: Pupils are equal, round, and reactive to light.   Pulmonary:      Effort: Pulmonary effort is normal. No respiratory distress.   Musculoskeletal:         General: Normal range of motion.      Cervical back: Normal range of motion.   Lymphadenopathy:      Cervical: No cervical adenopathy.   Skin:     General: Skin is dry.   Neurological:      Mental Status: She is alert and oriented to person, place, and time.   Psychiatric:         Behavior: Behavior normal.       Labs: I have reviewed the following labs:  Results for orders placed or performed in visit on 04/11/25   TIBC Panel (incl. Iron, TIBC, % Iron Saturation)   Result Value Ref Range    Iron Saturation 12 (L) 15 - 50 %    TIBC 420 250 - 450 ug/dL    Iron 52 50 - 212 ug/dL    Transferrin 300 203 - 362 mg/dL    UIBC 368 (H) 155 - 355 ug/dL   Result Value Ref Range    Ferritin 91 30 - 307 ng/mL

## 2025-05-01 NOTE — LETTER
May 5, 2025     Gloria Fernandez MD  [address] [address]    Patient: Karine Villarreal   YOB: 1980   Date of Visit: 2025       Dear Dr. Gloria Fernandez MD:    Thank you for referring Karine Villarreal to me for evaluation. Below are my notes for this consultation.    If you have questions, please do not hesitate to call me. I look forward to following your patient along with you.         Sincerely,        ARABELLA Lopez        CC: No Recipients    ARABELLA Lopez  2025  5:04 PM  Sign when Signing Visit  Name: Karine Villarreal      : 1980      MRN: [id number]  Encounter Provider: ARABELLA Lopez  Encounter Date: 2025   Encounter department: [facility] [place]  :  Assessment & Plan  Fatigue   Fatigue has improved, hemoglobin improved after iron infusions.  EGD showed normal esophagus stomach and duodenum, colonoscopy showed terminal ileum and entire colon appearing normal.  Patient has been on oral iron tolerating without difficulty.  We made arrangements for another round of iron infusions Venofer 200 mg IV x 3.  Although she received iron infusions in the hospital I   I reviewed indications and adverse reactions including shortness of breath, chest heaviness, headache, itching, Anaphylaxis/allergic reaction, arthralgias/myalgias, nausea; agrees to proceed with plan. Patient is advised to contact our office if they has any of the symptoms. Please let the infusion nurses know if you have any symptoms during the infusion.  If you tolerate the infusion and have side effects at home please contact our office if you has any of the symptoms.  During the infusion you can eat and drink.  Please bring something to do because you will be there for about 2 to 2-1/2 hours.  She states she had a headache after the 2nd and 3rd infusion.  Will plan for Tylenol 650 mg p.o. once as a premed.    Orders:  •  Ambulatory Referral  to Hematology / Oncology  •  CBC; Future  •  Iron Panel (Includes Ferritin, Iron Sat%, Iron, and TIBC); Future    Iron deficiency anemia   Recommended patient try a gummy iron supplement such as natures made ferrous fumarate 18 mg or a prenatal vitamin with iron softgel.    Orders:  •  Ambulatory Referral to Hematology / Oncology  •  CBC; Future  •  Iron Panel (Includes Ferritin, Iron Sat%, Iron, and TIBC); Future    Other iron deficiency anemia    Orders:  •  CBC; Future  •  Iron Panel (Includes Ferritin, Iron Sat%, Iron, and TIBC); Future      Return in about 4 months (around 9/3/2025) for Office Visit, Infusion - See Treatment Plan.    History of Present Illness  Chief Complaint   Patient presents with   • Consult     Pertinent Medical History    05/05/25: 44 y.o. female with a PMH of iron deficiency anemia felt fatigued and lethargic and was seen in the ER on 3/29/2025. She started taking iron supplementation on 3/28/2025 as prescribed by her PCP due to ferritin of 2, hemoglobin 8.8 MCV 73.  She was admitted for further evaluation after schistocytes found on hemolysis smear.  She received Venofer 200 mg IV x 3 during hospital stay.  Direct Cleo test negative, fit test positive for occult blood, LDH and haptoglobin within normal limits patient has heavy menses and previous history of H. pylori via stool antigen in October 2023.  She did not complete treatment or have follow-up.  Per patient she has been anemic since at least 2018.  CBC on day of discharge 4/11/2025 showed a hemoglobin of 10.9, MCV 78, normal WBC and platelet count.     Review of Systems   Constitutional:  Negative for activity change, appetite change, fatigue, fever and unexpected weight change.   Respiratory:  Negative for cough and shortness of breath.    Cardiovascular:  Negative for chest pain and leg swelling.   Gastrointestinal:  Negative for abdominal pain, constipation, diarrhea and nausea.   Endocrine: Negative for cold intolerance and  "heat intolerance.   Musculoskeletal:  Negative for arthralgias and myalgias.   Skin: Negative.    Neurological:  Negative for dizziness, weakness and headaches.   Hematological:  Negative for adenopathy. Does not bruise/bleed easily.         Objective  /74 (BP Location: Left arm, Patient Position: Sitting, Cuff Size: Standard)   Pulse (!) 110   Temp 98.4 °F (36.9 °C) (Temporal)   Resp 16   Ht 5' 3\" (1.6 m)   Wt 71.2 kg (157 lb)   SpO2 97%   BMI 27.81 kg/m²     Physical Exam  Vitals reviewed.   Constitutional:       Appearance: Normal appearance. She is well-developed.   HENT:      Head: Normocephalic and atraumatic.   Eyes:      Conjunctiva/sclera: Conjunctivae normal.      Pupils: Pupils are equal, round, and reactive to light.   Pulmonary:      Effort: Pulmonary effort is normal. No respiratory distress.   Musculoskeletal:         General: Normal range of motion.      Cervical back: Normal range of motion.   Lymphadenopathy:      Cervical: No cervical adenopathy.   Skin:     General: Skin is dry.   Neurological:      Mental Status: She is alert and oriented to person, place, and time.   Psychiatric:         Behavior: Behavior normal.       Labs: I have reviewed the following labs:  Results for orders placed or performed in visit on 04/11/25   TIBC Panel (incl. Iron, TIBC, % Iron Saturation)   Result Value Ref Range    Iron Saturation 12 (L) 15 - 50 %    TIBC 420 250 - 450 ug/dL    Iron 52 50 - 212 ug/dL    Transferrin 300 203 - 362 mg/dL    UIBC 368 (H) 155 - 355 ug/dL   Result Value Ref Range    Ferritin 91 30 - 307 ng/mL             "

## 2025-05-01 NOTE — ASSESSMENT & PLAN NOTE
Recommended patient try a gummy iron supplement such as natures made ferrous fumarate 18 mg or a prenatal vitamin with iron softgel.    Orders:    Ambulatory Referral to Hematology / Oncology    CBC; Future    Iron Panel (Includes Ferritin, Iron Sat%, Iron, and TIBC); Future

## 2025-05-15 ENCOUNTER — OFFICE VISIT (OUTPATIENT)
Dept: FAMILY MEDICINE CLINIC | Facility: CLINIC | Age: 45
End: 2025-05-15
Payer: COMMERCIAL

## 2025-05-15 VITALS
OXYGEN SATURATION: 98 % | HEIGHT: 63 IN | RESPIRATION RATE: 17 BRPM | BODY MASS INDEX: 27.82 KG/M2 | TEMPERATURE: 98.1 F | WEIGHT: 157 LBS | HEART RATE: 88 BPM | DIASTOLIC BLOOD PRESSURE: 70 MMHG | SYSTOLIC BLOOD PRESSURE: 110 MMHG

## 2025-05-15 DIAGNOSIS — Z00.00 ANNUAL PHYSICAL EXAM: Primary | ICD-10-CM

## 2025-05-15 DIAGNOSIS — D50.8 OTHER IRON DEFICIENCY ANEMIA: ICD-10-CM

## 2025-05-15 DIAGNOSIS — Z23 ENCOUNTER FOR IMMUNIZATION: ICD-10-CM

## 2025-05-15 PROBLEM — R42 DIZZINESS: Status: RESOLVED | Noted: 2025-03-30 | Resolved: 2025-05-15

## 2025-05-15 PROBLEM — R89.4 ABNORMAL CELIAC ANTIBODY PANEL: Status: RESOLVED | Noted: 2023-11-29 | Resolved: 2025-05-15

## 2025-05-15 PROCEDURE — 99396 PREV VISIT EST AGE 40-64: CPT | Performed by: FAMILY MEDICINE

## 2025-05-15 PROCEDURE — 90471 IMMUNIZATION ADMIN: CPT

## 2025-05-15 PROCEDURE — 90715 TDAP VACCINE 7 YRS/> IM: CPT

## 2025-05-15 NOTE — PROGRESS NOTES
Adult Annual Physical  Name: Karine Villarreal      : 1980      MRN: [id number]  Encounter Provider: Gloria Fernandez MD  Encounter Date: 5/15/2025   Encounter department: [facility]    :  Assessment & Plan  Annual physical exam    Orders:  •  Lipid Panel With Direct LDL; Future  •  Basic metabolic panel; Future    Other iron deficiency anemia  Improving but iron infusion is causing her headaches   To f/u with hematologist        Encounter for immunization    Orders:  •  TDAP VACCINE GREATER THAN OR EQUAL TO 8YO IM        Preventive Screenings:  - Diabetes Screening: screening up-to-date  - Cholesterol Screening: orders placed   - Hepatitis C screening: screening up-to-date   - HIV screening: screening up-to-date   - Cervical cancer screening: screening up-to-date   - Breast cancer screening: orders placed   - Colon cancer screening: screening up-to-date   - Lung cancer screening: screening not indicated     Immunizations:  - Immunizations due: Tdap    Counseling/Anticipatory Guidance:  - Alcohol: discussed moderation in alcohol intake and recommendations for healthy alcohol use.   - Drug use: discussed harms of illicit drug use and how it can negatively impact mental/physical health.   - Tobacco use: discussed harms of tobacco use and management options for quitting.   - Dental health: discussed importance of regular tooth brushing, flossing, and dental visits.   - Sexual health: discussed sexually transmitted diseases, partner selection, use of condoms, avoidance of unintended pregnancy, and contraceptive alternatives.   - Diet: discussed recommendations for a healthy/well-balanced diet.   - Exercise: the importance of regular exercise/physical activity was discussed. Recommend exercise 3-5 times per week for at least 30 minutes.   - Injury prevention: discussed safety/seat belts, safety helmets, smoke detectors, carbon monoxide detectors, and smoking near bedding or upholstery.        Depression Screening and Follow-up Plan: Patient was screened for depression during today's encounter. They screened negative with a PHQ-2 score of 0.          History of Present Illness     Adult Annual Physical:  Patient presents for annual physical.     Diet and Physical Activity:  - Diet/Nutrition: no special diet and consuming 3-5 servings of fruits/vegetables daily.    Depression Screening:  - PHQ-2 Score: 0    General Health:  - Sleep: sleeps well and 7-8 hours of sleep on average.  - Hearing: normal hearing bilateral ears.  - Vision: most recent eye exam > 1 year ago and no vision problems.  - Dental: brushes teeth twice daily and no dental visits for > 1 year.    /GYN Health:  - Follows with GYN: yes.   - Menopause: premenopausal.   - History of STDs: no    Advanced Care Planning:  - Has an advanced directive?: no    - Has a durable medical POA?: no    - ACP document given to patient?: no      Review of Systems   Constitutional: Negative.  Negative for chills and fever.   HENT: Negative.  Negative for ear pain and sore throat.    Eyes: Negative.  Negative for pain and visual disturbance.   Respiratory: Negative.  Negative for cough and shortness of breath.    Cardiovascular:  Negative for chest pain and palpitations.   Gastrointestinal:  Negative for abdominal pain and vomiting.   Endocrine: Negative.    Genitourinary: Negative.  Negative for dysuria and hematuria.   Musculoskeletal:  Negative for arthralgias and back pain.   Skin:  Negative for color change and rash.   Allergic/Immunologic: Negative.    Neurological:  Negative for seizures and syncope.   Hematological: Negative.    Psychiatric/Behavioral: Negative.     All other systems reviewed and are negative.    Medical History Reviewed by provider this encounter:  Allergies  Meds  Problems     .  Past Medical History   Past Medical History:   Diagnosis Date   • Abnormal celiac antibody panel 11/29/2023   • Anemia    • Dizziness 03/30/2025  "  • Epigastric abdominal pain 10/11/2023   • H. pylori infection 2023   • Headache(784.0)      Past Surgical History:   Procedure Laterality Date   •  SECTION       Family History   Problem Relation Age of Onset   • No Known Problems Mother    • No Known Problems Sister    • No Known Problems Brother    • No Known Problems Daughter    • No Known Problems Son       reports that she has never smoked. She has never been exposed to tobacco smoke. She has never used smokeless tobacco. She reports that she does not drink alcohol and does not use drugs.  Current Outpatient Medications   Medication Instructions   • ferrous sulfate 324 mg, Oral, Every other day   Allergies[1]   Medications Ordered Prior to Encounter[2]   Social History     Tobacco Use   • Smoking status: Never     Passive exposure: Never   • Smokeless tobacco: Never   Vaping Use   • Vaping status: Never Used   Substance and Sexual Activity   • Alcohol use: No   • Drug use: No   • Sexual activity: Not Currently     Partners: Male     Birth control/protection: None       Objective   /70 (BP Location: Left arm, Patient Position: Sitting, Cuff Size: Standard)   Pulse 88   Temp 98.1 °F (36.7 °C) (Tympanic)   Resp 17   Ht 5' 2.95\" (1.599 m)   Wt 71.2 kg (157 lb)   SpO2 98%   BMI 27.85 kg/m²     Physical Exam  Vitals and nursing note reviewed.   Constitutional:       Appearance: Normal appearance.   HENT:      Head: Normocephalic and atraumatic.      Right Ear: Tympanic membrane normal.      Left Ear: Tympanic membrane normal.      Mouth/Throat:      Mouth: Mucous membranes are moist.     Eyes:      Pupils: Pupils are equal, round, and reactive to light.       Cardiovascular:      Rate and Rhythm: Normal rate and regular rhythm.      Pulses: Normal pulses.      Heart sounds: Normal heart sounds.   Pulmonary:      Effort: Pulmonary effort is normal.     Musculoskeletal:         General: Normal range of motion.      Cervical back: Normal " range of motion and neck supple.     Neurological:      General: No focal deficit present.      Mental Status: She is alert and oriented to person, place, and time.     Psychiatric:         Mood and Affect: Mood normal.         Behavior: Behavior normal.                [1]  No Known Allergies[2]  Current Outpatient Medications on File Prior to Visit   Medication Sig Dispense Refill   • ferrous sulfate 324 (65 Fe) mg Take 1 tablet (324 mg total) by mouth every other day 90 tablet 0   • [DISCONTINUED] nystatin (MYCOSTATIN) cream Apply topically 2 (two) times a day for 7 days (Patient not taking: Reported on 5/15/2025) 15 g 0     No current facility-administered medications on file prior to visit.

## 2025-05-28 DIAGNOSIS — D50.8 OTHER IRON DEFICIENCY ANEMIA: Primary | ICD-10-CM

## 2025-05-30 ENCOUNTER — HOSPITAL ENCOUNTER (OUTPATIENT)
Dept: INFUSION CENTER | Facility: CLINIC | Age: 45
Discharge: HOME | End: 2025-05-30
Attending: INTERNAL MEDICINE
Payer: COMMERCIAL

## 2025-05-30 VITALS
OXYGEN SATURATION: 100 % | HEART RATE: 69 BPM | RESPIRATION RATE: 16 BRPM | TEMPERATURE: 97.8 F | SYSTOLIC BLOOD PRESSURE: 106 MMHG | DIASTOLIC BLOOD PRESSURE: 66 MMHG

## 2025-05-30 DIAGNOSIS — D50.8 OTHER IRON DEFICIENCY ANEMIA: Primary | ICD-10-CM

## 2025-05-30 PROCEDURE — 96365 THER/PROPH/DIAG IV INF INIT: CPT

## 2025-05-30 RX ORDER — ACETAMINOPHEN 325 MG/1
650 TABLET ORAL ONCE
Status: COMPLETED | OUTPATIENT
Start: 2025-05-30 | End: 2025-05-30

## 2025-05-30 RX ORDER — ACETAMINOPHEN 325 MG/1
650 TABLET ORAL ONCE
Status: CANCELLED
Start: 2025-06-06 | End: 2025-06-06

## 2025-05-30 RX ORDER — SODIUM CHLORIDE 9 MG/ML
20 INJECTION, SOLUTION INTRAVENOUS ONCE
Status: COMPLETED | OUTPATIENT
Start: 2025-05-30 | End: 2025-05-30

## 2025-05-30 RX ORDER — SODIUM CHLORIDE 9 MG/ML
20 INJECTION, SOLUTION INTRAVENOUS ONCE
Status: CANCELLED | OUTPATIENT
Start: 2025-06-06

## 2025-05-30 RX ADMIN — IRON SUCROSE 200 MG: 20 INJECTION, SOLUTION INTRAVENOUS at 12:12

## 2025-05-30 RX ADMIN — ACETAMINOPHEN 650 MG: 325 TABLET ORAL at 12:12

## 2025-05-30 RX ADMIN — SODIUM CHLORIDE 20 ML/HR: 0.9 INJECTION, SOLUTION INTRAVENOUS at 12:12

## 2025-05-30 NOTE — PROGRESS NOTES
Pt here for venofer, tolerated well with no complaints. Next appt 6/6 at 1230 at [facility]. Declined AVS.

## 2025-06-10 ENCOUNTER — TELEPHONE (OUTPATIENT)
Dept: INFUSION CENTER | Facility: CLINIC | Age: 45
End: 2025-06-10

## 2025-06-10 NOTE — TELEPHONE ENCOUNTER
Left VM for pt regarding missed two appts, as well as upcoming appts. Requested a call back to [facility] so we can review our appt policy. Call back number left.

## 2025-06-12 NOTE — TELEPHONE ENCOUNTER
Second attempt was made to reach out to patient to confirm appointment on 6/13 and go over Infusion no-show and late policy . Message and call back number was left

## 2025-06-13 ENCOUNTER — HOSPITAL ENCOUNTER (OUTPATIENT)
Dept: INFUSION CENTER | Facility: CLINIC | Age: 45
End: 2025-06-13
Attending: INTERNAL MEDICINE
Payer: COMMERCIAL

## 2025-06-13 VITALS
DIASTOLIC BLOOD PRESSURE: 66 MMHG | OXYGEN SATURATION: 99 % | SYSTOLIC BLOOD PRESSURE: 102 MMHG | RESPIRATION RATE: 15 BRPM | HEART RATE: 64 BPM | TEMPERATURE: 97.6 F

## 2025-06-13 DIAGNOSIS — D50.8 OTHER IRON DEFICIENCY ANEMIA: Primary | ICD-10-CM

## 2025-06-13 PROCEDURE — 96365 THER/PROPH/DIAG IV INF INIT: CPT

## 2025-06-13 RX ORDER — SODIUM CHLORIDE 9 MG/ML
20 INJECTION, SOLUTION INTRAVENOUS ONCE
Status: CANCELLED | OUTPATIENT
Start: 2025-06-20

## 2025-06-13 RX ORDER — ACETAMINOPHEN 325 MG/1
650 TABLET ORAL ONCE
Status: CANCELLED
Start: 2025-06-20 | End: 2025-06-20

## 2025-06-13 RX ORDER — ACETAMINOPHEN 325 MG/1
650 TABLET ORAL ONCE
Status: COMPLETED | OUTPATIENT
Start: 2025-06-13 | End: 2025-06-13

## 2025-06-13 RX ORDER — SODIUM CHLORIDE 9 MG/ML
20 INJECTION, SOLUTION INTRAVENOUS ONCE
Status: COMPLETED | OUTPATIENT
Start: 2025-06-13 | End: 2025-06-13

## 2025-06-13 RX ADMIN — SODIUM CHLORIDE 20 ML/HR: 0.9 INJECTION, SOLUTION INTRAVENOUS at 08:49

## 2025-06-13 RX ADMIN — ACETAMINOPHEN 650 MG: 325 TABLET ORAL at 08:48

## 2025-06-13 RX ADMIN — IRON SUCROSE 200 MG: 20 INJECTION, SOLUTION INTRAVENOUS at 08:50

## 2025-06-13 NOTE — PROGRESS NOTES
Pt here for venofer, tolerated well with no complaints. Next appt 6/20 at 0730 at [facility]. Declined AVS.

## 2025-06-20 ENCOUNTER — HOSPITAL ENCOUNTER (OUTPATIENT)
Dept: INFUSION CENTER | Facility: CLINIC | Age: 45
End: 2025-06-20
Attending: INTERNAL MEDICINE
Payer: COMMERCIAL

## 2025-06-20 VITALS
TEMPERATURE: 97.1 F | DIASTOLIC BLOOD PRESSURE: 60 MMHG | OXYGEN SATURATION: 100 % | SYSTOLIC BLOOD PRESSURE: 99 MMHG | HEART RATE: 61 BPM

## 2025-06-20 DIAGNOSIS — D50.8 OTHER IRON DEFICIENCY ANEMIA: Primary | ICD-10-CM

## 2025-06-20 PROCEDURE — 96365 THER/PROPH/DIAG IV INF INIT: CPT

## 2025-06-20 RX ORDER — SODIUM CHLORIDE 9 MG/ML
20 INJECTION, SOLUTION INTRAVENOUS ONCE
Status: CANCELLED | OUTPATIENT
Start: 2025-06-20

## 2025-06-20 RX ORDER — ACETAMINOPHEN 325 MG/1
650 TABLET ORAL ONCE
Status: CANCELLED
Start: 2025-06-20 | End: 2025-06-20

## 2025-06-20 RX ORDER — SODIUM CHLORIDE 9 MG/ML
20 INJECTION, SOLUTION INTRAVENOUS ONCE
Status: COMPLETED | OUTPATIENT
Start: 2025-06-20 | End: 2025-06-20

## 2025-06-20 RX ORDER — ACETAMINOPHEN 325 MG/1
650 TABLET ORAL ONCE
Status: COMPLETED | OUTPATIENT
Start: 2025-06-20 | End: 2025-06-20

## 2025-06-20 RX ADMIN — ACETAMINOPHEN 650 MG: 325 TABLET ORAL at 07:45

## 2025-06-20 RX ADMIN — IRON SUCROSE 200 MG: 20 INJECTION, SOLUTION INTRAVENOUS at 07:45

## 2025-06-20 RX ADMIN — SODIUM CHLORIDE 20 ML/HR: 0.9 INJECTION, SOLUTION INTRAVENOUS at 07:48

## 2025-06-20 NOTE — PROGRESS NOTES
Pt. tolerated Venofer infusion without incident, AVS declined. F/u with provider appt confirmed for 9/3 @ 1600.

## 2025-07-03 ENCOUNTER — HOSPITAL ENCOUNTER (OUTPATIENT)
Dept: MAMMOGRAPHY | Facility: HOSPITAL | Age: 45
Discharge: HOME | End: 2025-07-03
Payer: COMMERCIAL

## 2025-07-03 VITALS — HEIGHT: 63 IN | BODY MASS INDEX: 27.82 KG/M2 | WEIGHT: 157 LBS

## 2025-07-03 DIAGNOSIS — Z12.31 ENCOUNTER FOR SCREENING MAMMOGRAM FOR BREAST CANCER: ICD-10-CM

## 2025-07-03 PROCEDURE — 77067 SCR MAMMO BI INCL CAD: CPT

## 2025-07-03 PROCEDURE — 77063 BREAST TOMOSYNTHESIS BI: CPT

## 2025-07-24 ENCOUNTER — TELEPHONE (OUTPATIENT)
Dept: MAMMOGRAPHY | Facility: CLINIC | Age: 45
End: 2025-07-24

## 2025-07-24 NOTE — TELEPHONE ENCOUNTER
Attempted contact with patient to schedule diagnostic Ultrasound recommended from her screening mammogram, left name/# with request for cb    Attempts made on 7/9/25 at 1144, 7/16/25 at 1416 and the final attempt on 7/22/25 at 1037 leaving messages each attempt. Will route to ordering provider